# Patient Record
Sex: MALE | Race: AMERICAN INDIAN OR ALASKA NATIVE | NOT HISPANIC OR LATINO | Employment: OTHER | URBAN - METROPOLITAN AREA
[De-identification: names, ages, dates, MRNs, and addresses within clinical notes are randomized per-mention and may not be internally consistent; named-entity substitution may affect disease eponyms.]

---

## 2018-12-26 ENCOUNTER — TELEPHONE (OUTPATIENT)
Dept: SCHEDULING | Facility: IMAGING CENTER | Age: 68
End: 2018-12-26

## 2019-01-03 ENCOUNTER — OFFICE VISIT (OUTPATIENT)
Dept: MEDICAL GROUP | Facility: PHYSICIAN GROUP | Age: 69
End: 2019-01-03
Payer: MEDICARE

## 2019-01-03 VITALS
WEIGHT: 145 LBS | TEMPERATURE: 98.1 F | SYSTOLIC BLOOD PRESSURE: 132 MMHG | RESPIRATION RATE: 16 BRPM | HEIGHT: 64 IN | HEART RATE: 67 BPM | DIASTOLIC BLOOD PRESSURE: 78 MMHG | BODY MASS INDEX: 24.75 KG/M2 | OXYGEN SATURATION: 96 %

## 2019-01-03 DIAGNOSIS — I10 ESSENTIAL HYPERTENSION: ICD-10-CM

## 2019-01-03 PROCEDURE — 99203 OFFICE O/P NEW LOW 30 MIN: CPT | Performed by: FAMILY MEDICINE

## 2019-01-03 RX ORDER — DULOXETIN HYDROCHLORIDE 60 MG/1
60 CAPSULE, DELAYED RELEASE ORAL DAILY
COMMUNITY
End: 2019-08-29 | Stop reason: SDUPTHER

## 2019-01-03 RX ORDER — METHADONE HYDROCHLORIDE 5 MG/1
5 TABLET ORAL EVERY 12 HOURS
Refills: 0 | COMMUNITY
Start: 2018-12-14 | End: 2022-09-23

## 2019-01-03 RX ORDER — PROPRANOLOL HYDROCHLORIDE 80 MG/1
CAPSULE, EXTENDED RELEASE ORAL
COMMUNITY
Start: 2018-11-20 | End: 2020-02-21

## 2019-01-03 RX ORDER — ALBUTEROL SULFATE 90 UG/1
2 AEROSOL, METERED RESPIRATORY (INHALATION) EVERY 6 HOURS PRN
COMMUNITY
End: 2020-08-19

## 2019-01-03 RX ORDER — ACYCLOVIR 400 MG/1
400 TABLET ORAL
COMMUNITY
End: 2019-02-25 | Stop reason: SDUPTHER

## 2019-01-03 ASSESSMENT — PATIENT HEALTH QUESTIONNAIRE - PHQ9: CLINICAL INTERPRETATION OF PHQ2 SCORE: 0

## 2019-01-04 NOTE — PROGRESS NOTES
Complaint: Review meds     Subjective:     Raffaele Burton is a 68 y.o. male here today to review his meds and establish with new provider.    Essential hypertension  Doing well on Inderal LA 80 mg/day.     Has not been taking his Carbidopa/levodopa for his RLS in months. Not needing his MDI recently at all.    Gets eyes and teeth checked regularly.    Lives alone, 2 cats, works on his classic car.    Current medicines (including changes today)  Current Outpatient Prescriptions   Medication Sig Dispense Refill   • DULoxetine (CYMBALTA) 60 MG Cap DR Particles delayed-release capsule Take 60 mg by mouth every day.     • acyclovir (ZOVIRAX) 400 MG tablet Take 400 mg by mouth 2 times daily with meals as needed.     • albuterol 108 (90 Base) MCG/ACT Aero Soln inhalation aerosol Inhale 2 Puffs by mouth every 6 hours as needed.     • propranolol CR (INDERAL LA) 80 MG CAPSULE SR 24 HR      • methadone (DOLOPHINE) 5 MG Tab Take 5 mg by mouth every 12 hours. FOR 30 DAYS  0     No current facility-administered medications for this visit.      He  has a past medical history of Anxiety; Asthma; Herpes simplex; Hypertension; Restless leg syndrome; and Shoulder pain, right.    Health Maintenance: utd according to patient.      Allergies: Benadryl [diphenhydramine] and Hydroxyzine    Current Outpatient Prescriptions Ordered in [facility]   Medication Sig Dispense Refill   • DULoxetine (CYMBALTA) 60 MG Cap DR Particles delayed-release capsule Take 60 mg by mouth every day.     • acyclovir (ZOVIRAX) 400 MG tablet Take 400 mg by mouth 2 times daily with meals as needed.     • albuterol 108 (90 Base) MCG/ACT Aero Soln inhalation aerosol Inhale 2 Puffs by mouth every 6 hours as needed.     • propranolol CR (INDERAL LA) 80 MG CAPSULE SR 24 HR      • methadone (DOLOPHINE) 5 MG Tab Take 5 mg by mouth every 12 hours. FOR 30 DAYS  0     No current [facility]-ordered facility-administered medications on file.        Past Medical History:   Diagnosis Date  "  • Anxiety    • Asthma    • Herpes simplex    • Hypertension    • Restless leg syndrome    • Shoulder pain, right        Past Surgical History:   Procedure Laterality Date   • ACL RECONSTRUCTION Left    • ARTHROPLASTY, SHOULDER Right    • HERNIA REPAIR Left    • TONSILLECTOMY AND ADENOIDECTOMY     • VASECTOMY         Social History   Substance Use Topics   • Smoking status: Former Smoker     Types: Cigarettes     Quit date: 1/3/2014   • Smokeless tobacco: Never Used   • Alcohol use No       Social History     Social History Narrative   • No narrative on file       Family History   Problem Relation Age of Onset   • Cancer Mother         breast   • Diabetes Mother    • Other Sister         Crohn's   • Alcohol/Drug Brother    • Hypertension Sister    • Alcohol/Drug Brother    • Heart Disease Brother          ROS   Patient denies any fever, chills, unintentional weight gain/loss, fatigue, stroke symptoms, dizziness, headache, nasal congestion, sore-throat, cough, heartburn, chest pain, difficulty breathing, abdominal discomfort, diarrhea/constipation, burning with urination or frequency, joint or back pain, skin rashes, depression or anxiety.       Objective:     Blood pressure 132/78, pulse 67, temperature 36.7 °C (98.1 °F), temperature source Temporal, resp. rate 16, height 1.626 m (5' 4\"), weight 65.8 kg (145 lb), SpO2 96 %. Body mass index is 24.89 kg/m².   Physical Exam:  Constitutional: Alert, no distress.  Skin: Warm, dry, good turgor, no rashes in visible areas.  Eye: Equal, round and reactive, conjunctiva clear, lids normal.  ENMT: Lips without lesions, average dentition, oropharynx clear.  Neck: Trachea midline, no masses, no thyromegaly. No cervical or supraclavicular lymphadenopathy  Respiratory: Unlabored respiratory effort, lungs clear to auscultation, no wheezes, no ronchi.  Cardiovascular: Normal S1, S2, no murmur, no extremity edema.  Psych: Alert and oriented x3, appropriate affect and " mood.        Assessment and Plan:   The following treatment plan was discussed    1. Essential hypertension  Chronic problem. Controlled on med.      Followup: Return in about 6 months (around 7/3/2019), or if symptoms worsen or fail to improve.    Please note that this dictation was created using voice recognition software. I have made every reasonable attempt to correct obvious errors, but I expect that there are errors of grammar and possibly content that I did not discover before finalizing the note.

## 2019-02-25 RX ORDER — ACYCLOVIR 400 MG/1
400 TABLET ORAL
Qty: 180 TAB | Refills: 1 | Status: SHIPPED | OUTPATIENT
Start: 2019-02-25 | End: 2019-09-01 | Stop reason: SDUPTHER

## 2019-06-13 ENCOUNTER — TELEPHONE (OUTPATIENT)
Dept: MEDICAL GROUP | Facility: PHYSICIAN GROUP | Age: 69
End: 2019-06-13

## 2019-06-13 NOTE — TELEPHONE ENCOUNTER
Carbodipa-Levodopa 25-50  Take 1 tab by mouth daily    This is not in the medication list so I am sending this as an message.

## 2019-06-17 RX ORDER — CARBIDOPA/LEVODOPA 25MG-250MG
1 TABLET ORAL 3 TIMES DAILY
COMMUNITY
End: 2019-06-17 | Stop reason: SDUPTHER

## 2019-06-17 RX ORDER — CARBIDOPA/LEVODOPA 25MG-250MG
1 TABLET ORAL DAILY
Qty: 90 TAB | Refills: 1 | Status: SHIPPED | OUTPATIENT
Start: 2019-06-17 | End: 2019-11-05

## 2019-06-18 ENCOUNTER — OFFICE VISIT (OUTPATIENT)
Dept: URGENT CARE | Facility: CLINIC | Age: 69
End: 2019-06-18
Payer: MEDICARE

## 2019-06-18 VITALS
BODY MASS INDEX: 25.61 KG/M2 | DIASTOLIC BLOOD PRESSURE: 84 MMHG | HEART RATE: 81 BPM | TEMPERATURE: 97.7 F | SYSTOLIC BLOOD PRESSURE: 120 MMHG | RESPIRATION RATE: 16 BRPM | WEIGHT: 150 LBS | OXYGEN SATURATION: 94 % | HEIGHT: 64 IN

## 2019-06-18 DIAGNOSIS — R10.12 LEFT UPPER QUADRANT PAIN: ICD-10-CM

## 2019-06-18 PROCEDURE — 99213 OFFICE O/P EST LOW 20 MIN: CPT | Performed by: PHYSICIAN ASSISTANT

## 2019-06-18 ASSESSMENT — ENCOUNTER SYMPTOMS
SORE THROAT: 0
COUGH: 0
EYE DISCHARGE: 0
HEADACHES: 0
SHORTNESS OF BREATH: 0
EYE REDNESS: 0
DIARRHEA: 1
FEVER: 1
VOMITING: 0
NAUSEA: 0
HEMATOCHEZIA: 0
ABDOMINAL PAIN: 1
CONSTIPATION: 0
MYALGIAS: 0

## 2019-06-18 NOTE — PROGRESS NOTES
Subjective:      Raffaele Burton is a 68 y.o. male who presents with Abdominal Pain (left side)          Abdominal Pain   This is a new problem. Episode onset: x 4 days. The onset quality is gradual. The problem occurs constantly. The problem has been gradually improving. The pain is located in the LUQ. The pain is mild. The quality of the pain is aching and dull. The abdominal pain does not radiate. Associated symptoms include diarrhea (after taking the laxative, now resolved) and a fever (subjective fever, now resolved). Pertinent negatives include no constipation, dysuria, frequency, headaches, hematochezia, myalgias, nausea or vomiting. The pain is aggravated by certain positions. The pain is relieved by nothing. Treatments tried: Laxative. The treatment provided mild relief.     The patient presents to clinic c/o left upper quadrant abdominal pain x 4 days. The patient states he developed the pain after eating a large meal. The patient states the pain has gradually improved over the past few days. The patient has taken a laxative for his symptoms. The patient reports diarrhea after taking the laxative, but states this is now resolved. The patient reports normal bowel movements. The patient denies nausea/vomiting, dysuria, bloody stools, heartburn, chest pain, and fever.     PMH:  has a past medical history of Anxiety; Asthma; Herpes simplex; Hypertension; Restless leg syndrome; and Shoulder pain, right.  MEDS:   Current Outpatient Prescriptions:   •  carbidopa-levodopa (SINEMET)  MG Tab, Take 1 Tab by mouth every day., Disp: 90 Tab, Rfl: 1  •  acyclovir (ZOVIRAX) 400 MG tablet, Take 1 Tab by mouth 2 times daily with meals as needed., Disp: 180 Tab, Rfl: 1  •  propranolol CR (INDERAL LA) 80 MG CAPSULE SR 24 HR, , Disp: , Rfl:   •  methadone (DOLOPHINE) 5 MG Tab, Take 5 mg by mouth every 12 hours. FOR 30 DAYS, Disp: , Rfl: 0  •  DULoxetine (CYMBALTA) 60 MG Cap DR Particles delayed-release capsule, Take 60  "mg by mouth every day., Disp: , Rfl:   •  albuterol 108 (90 Base) MCG/ACT Aero Soln inhalation aerosol, Inhale 2 Puffs by mouth every 6 hours as needed., Disp: , Rfl:   ALLERGIES:   Allergies   Allergen Reactions   • Benadryl [Diphenhydramine]    • Hydroxyzine      psychosis     SURGHX:   Past Surgical History:   Procedure Laterality Date   • ACL RECONSTRUCTION Left    • ARTHROPLASTY, SHOULDER Right    • HERNIA REPAIR Left    • TONSILLECTOMY AND ADENOIDECTOMY     • VASECTOMY       SOCHX:  reports that he quit smoking about 5 years ago. His smoking use included Cigarettes. He has never used smokeless tobacco. He reports that he does not drink alcohol or use drugs.  FH: Family history was reviewed, no pertinent findings to report    Review of Systems   Constitutional: Positive for fever (subjective fever, now resolved).   HENT: Negative for congestion, ear pain and sore throat.    Eyes: Negative for discharge and redness.   Respiratory: Negative for cough and shortness of breath.    Cardiovascular: Negative for chest pain and leg swelling.   Gastrointestinal: Positive for abdominal pain and diarrhea (after taking the laxative, now resolved). Negative for constipation, hematochezia, nausea and vomiting.   Genitourinary: Negative for dysuria and frequency.   Musculoskeletal: Negative for myalgias.   Skin: Negative for rash.   Neurological: Negative for headaches.   All other systems reviewed and are negative.         Objective:     /84 (BP Location: Right arm, Patient Position: Sitting)   Pulse 81   Temp 36.5 °C (97.7 °F)   Resp 16   Ht 1.626 m (5' 4\")   Wt 68 kg (150 lb)   SpO2 94%   BMI 25.75 kg/m²      Physical Exam   Constitutional: He is oriented to person, place, and time. He appears well-developed and well-nourished. No distress.   HENT:   Head: Normocephalic and atraumatic.   Right Ear: External ear normal.   Left Ear: External ear normal.   Nose: Nose normal.   Mouth/Throat: Oropharynx is clear and " moist.   Eyes: Conjunctivae and EOM are normal.   Neck: Normal range of motion. Neck supple.   Cardiovascular: Normal rate, regular rhythm and normal heart sounds.    Pulmonary/Chest: Effort normal and breath sounds normal.   Abdominal: Soft. Bowel sounds are normal. There is tenderness in the left upper quadrant. There is no rigidity, no rebound and no CVA tenderness.   Musculoskeletal: Normal range of motion. He exhibits no edema.   Neurological: He is alert and oriented to person, place, and time.   Skin: Skin is warm and dry.          Progress:  Abdominal US - pending     CBC - pending    CMP - pending     Lipase - pending     Assessment/Plan:     1. Left upper quadrant pain  The cause of the patient's left upper quadrant abdominal pain is unknown at this time.  Will order an abdominal ultrasound to further evaluate the patient's symptoms.  Will also order a CBC, CMP, and Lipase to further evaluate the patient's symptoms.  Will call the patient with results.  Recommend the patient follow-up with his PCP regarding his symptoms.  Discussed strict return precautions with the patient, and he verbalized understanding.    - CBC WITH DIFFERENTIAL; Future  - Comp Metabolic Panel; Future  - LIPASE; Future  - US-ABDOMEN COMPLETE SURVEY; Future    OTC Tylenol or Motrin for fever/discomfort.  Drink plenty of fluids  Lucas diet  Follow-up with PCP as needed  Return to clinic or go to the ED if symptoms worsen or fail to improve, or if the patient should develop worsening/increasing/persistent abdominal pain, nausea/vomiting, diarrhea, urinary symptoms, bloody stools, heartburn, chest pain, shortness of breath, fever/chills, and/or any concerning symptoms.    Discussed plan with the patient, and he agrees to the above.

## 2019-06-19 ENCOUNTER — HOSPITAL ENCOUNTER (OUTPATIENT)
Dept: LAB | Facility: MEDICAL CENTER | Age: 69
End: 2019-06-19
Attending: PHYSICIAN ASSISTANT
Payer: MEDICARE

## 2019-06-19 ENCOUNTER — DOCUMENTATION (OUTPATIENT)
Dept: URGENT CARE | Facility: CLINIC | Age: 69
End: 2019-06-19

## 2019-06-19 DIAGNOSIS — R10.12 LEFT UPPER QUADRANT PAIN: ICD-10-CM

## 2019-06-19 LAB
ALBUMIN SERPL BCP-MCNC: 4.6 G/DL (ref 3.2–4.9)
ALBUMIN/GLOB SERPL: 1.7 G/DL
ALP SERPL-CCNC: 98 U/L (ref 30–99)
ALT SERPL-CCNC: 19 U/L (ref 2–50)
ANION GAP SERPL CALC-SCNC: 8 MMOL/L (ref 0–11.9)
AST SERPL-CCNC: 19 U/L (ref 12–45)
BASOPHILS # BLD AUTO: 0.9 % (ref 0–1.8)
BASOPHILS # BLD: 0.09 K/UL (ref 0–0.12)
BILIRUB SERPL-MCNC: 0.7 MG/DL (ref 0.1–1.5)
BUN SERPL-MCNC: 14 MG/DL (ref 8–22)
CALCIUM SERPL-MCNC: 9.9 MG/DL (ref 8.5–10.5)
CHLORIDE SERPL-SCNC: 104 MMOL/L (ref 96–112)
CO2 SERPL-SCNC: 28 MMOL/L (ref 20–33)
CREAT SERPL-MCNC: 0.99 MG/DL (ref 0.5–1.4)
EOSINOPHIL # BLD AUTO: 0.2 K/UL (ref 0–0.51)
EOSINOPHIL NFR BLD: 2 % (ref 0–6.9)
ERYTHROCYTE [DISTWIDTH] IN BLOOD BY AUTOMATED COUNT: 43.8 FL (ref 35.9–50)
GLOBULIN SER CALC-MCNC: 2.7 G/DL (ref 1.9–3.5)
GLUCOSE SERPL-MCNC: 115 MG/DL (ref 65–99)
HCT VFR BLD AUTO: 45.8 % (ref 42–52)
HGB BLD-MCNC: 14.9 G/DL (ref 14–18)
IMM GRANULOCYTES # BLD AUTO: 0.05 K/UL (ref 0–0.11)
IMM GRANULOCYTES NFR BLD AUTO: 0.5 % (ref 0–0.9)
LIPASE SERPL-CCNC: 23 U/L (ref 11–82)
LYMPHOCYTES # BLD AUTO: 1.87 K/UL (ref 1–4.8)
LYMPHOCYTES NFR BLD: 19 % (ref 22–41)
MCH RBC QN AUTO: 30.8 PG (ref 27–33)
MCHC RBC AUTO-ENTMCNC: 32.5 G/DL (ref 33.7–35.3)
MCV RBC AUTO: 94.6 FL (ref 81.4–97.8)
MONOCYTES # BLD AUTO: 0.86 K/UL (ref 0–0.85)
MONOCYTES NFR BLD AUTO: 8.7 % (ref 0–13.4)
NEUTROPHILS # BLD AUTO: 6.77 K/UL (ref 1.82–7.42)
NEUTROPHILS NFR BLD: 68.9 % (ref 44–72)
NRBC # BLD AUTO: 0 K/UL
NRBC BLD-RTO: 0 /100 WBC
PLATELET # BLD AUTO: 236 K/UL (ref 164–446)
PMV BLD AUTO: 10.6 FL (ref 9–12.9)
POTASSIUM SERPL-SCNC: 4.4 MMOL/L (ref 3.6–5.5)
PROT SERPL-MCNC: 7.3 G/DL (ref 6–8.2)
RBC # BLD AUTO: 4.84 M/UL (ref 4.7–6.1)
SODIUM SERPL-SCNC: 140 MMOL/L (ref 135–145)
WBC # BLD AUTO: 9.8 K/UL (ref 4.8–10.8)

## 2019-06-19 PROCEDURE — 85025 COMPLETE CBC W/AUTO DIFF WBC: CPT

## 2019-06-19 PROCEDURE — 80053 COMPREHEN METABOLIC PANEL: CPT

## 2019-06-19 PROCEDURE — 36415 COLL VENOUS BLD VENIPUNCTURE: CPT

## 2019-06-19 PROCEDURE — 83690 ASSAY OF LIPASE: CPT

## 2019-06-20 ENCOUNTER — TELEPHONE (OUTPATIENT)
Dept: URGENT CARE | Facility: CLINIC | Age: 69
End: 2019-06-20

## 2019-06-20 DIAGNOSIS — K80.20 CALCULUS OF GALLBLADDER WITHOUT CHOLECYSTITIS WITHOUT OBSTRUCTION: ICD-10-CM

## 2019-06-20 NOTE — TELEPHONE ENCOUNTER
6/20 @ 10:17am    Spoke with the patient regarding his US and laboratory results. Informed the patient the US of his abdomen showed gall stones without acute inflammation. Will refer the patient to general surgery for further evaluation. Additionally, the patient's CBC and CMP showed no acute abnormalities. The patient's Lipase was within normal limits. Discussed return precautions with the patient, and he verbalized understanding. Recommend the patient follow-up with his PCP.

## 2019-07-09 ENCOUNTER — HOSPITAL ENCOUNTER (OUTPATIENT)
Dept: LAB | Facility: MEDICAL CENTER | Age: 69
End: 2019-07-09
Attending: FAMILY MEDICINE
Payer: MEDICARE

## 2019-07-09 ENCOUNTER — OFFICE VISIT (OUTPATIENT)
Dept: MEDICAL GROUP | Facility: PHYSICIAN GROUP | Age: 69
End: 2019-07-09
Payer: MEDICARE

## 2019-07-09 VITALS
DIASTOLIC BLOOD PRESSURE: 82 MMHG | WEIGHT: 150.57 LBS | SYSTOLIC BLOOD PRESSURE: 120 MMHG | HEART RATE: 78 BPM | RESPIRATION RATE: 14 BRPM | HEIGHT: 65 IN | OXYGEN SATURATION: 94 % | BODY MASS INDEX: 25.09 KG/M2 | TEMPERATURE: 98 F

## 2019-07-09 DIAGNOSIS — B00.9 HERPES SIMPLEX: ICD-10-CM

## 2019-07-09 DIAGNOSIS — G25.81 RLS (RESTLESS LEGS SYNDROME): ICD-10-CM

## 2019-07-09 DIAGNOSIS — R73.9 ELEVATED BLOOD SUGAR: ICD-10-CM

## 2019-07-09 DIAGNOSIS — I10 ESSENTIAL HYPERTENSION: ICD-10-CM

## 2019-07-09 DIAGNOSIS — J45.20 MILD INTERMITTENT ASTHMA WITHOUT COMPLICATION: ICD-10-CM

## 2019-07-09 PROBLEM — K80.20 CHOLELITHIASIS: Status: ACTIVE | Noted: 2019-07-09

## 2019-07-09 LAB
EST. AVERAGE GLUCOSE BLD GHB EST-MCNC: 111 MG/DL
HBA1C MFR BLD: 5.5 % (ref 0–5.6)

## 2019-07-09 PROCEDURE — 36415 COLL VENOUS BLD VENIPUNCTURE: CPT | Mod: GA

## 2019-07-09 PROCEDURE — 99214 OFFICE O/P EST MOD 30 MIN: CPT | Performed by: FAMILY MEDICINE

## 2019-07-09 PROCEDURE — 83036 HEMOGLOBIN GLYCOSYLATED A1C: CPT | Mod: GA

## 2019-08-29 ENCOUNTER — OFFICE VISIT (OUTPATIENT)
Dept: MEDICAL GROUP | Facility: PHYSICIAN GROUP | Age: 69
End: 2019-08-29
Payer: MEDICARE

## 2019-08-29 VITALS
HEART RATE: 82 BPM | HEIGHT: 64 IN | SYSTOLIC BLOOD PRESSURE: 122 MMHG | BODY MASS INDEX: 25.61 KG/M2 | TEMPERATURE: 98.5 F | OXYGEN SATURATION: 97 % | RESPIRATION RATE: 12 BRPM | DIASTOLIC BLOOD PRESSURE: 70 MMHG | WEIGHT: 150 LBS

## 2019-08-29 DIAGNOSIS — R68.89 FEELING UNWELL: ICD-10-CM

## 2019-08-29 DIAGNOSIS — G89.29 ENCOUNTER FOR CHRONIC PAIN MANAGEMENT: ICD-10-CM

## 2019-08-29 DIAGNOSIS — F11.93 WITHDRAWAL FROM OPIOIDS (HCC): ICD-10-CM

## 2019-08-29 PROBLEM — K80.20 CHOLELITHIASIS: Status: RESOLVED | Noted: 2019-07-09 | Resolved: 2019-08-29

## 2019-08-29 PROCEDURE — 99213 OFFICE O/P EST LOW 20 MIN: CPT | Performed by: FAMILY MEDICINE

## 2019-08-29 RX ORDER — DULOXETIN HYDROCHLORIDE 60 MG/1
CAPSULE, DELAYED RELEASE ORAL
Qty: 20 CAP | Refills: 0 | Status: SHIPPED | OUTPATIENT
Start: 2019-08-29 | End: 2019-09-17

## 2019-08-29 NOTE — ASSESSMENT & PLAN NOTE
Having cramping in legs, numb feeling. Has been on Cymbalta, started by previous provider. Did nothing for his pain.    Ran out of methadone the other day, due for refill in 2 days. Lightheaded, nausea, achy all over.

## 2019-08-29 NOTE — PROGRESS NOTES
Complaint: Not feeling well.     Subjective:     Raffaele Burton is a 68 y.o. male here today for f/u.    Feeling unwell  Having cramping in legs, numb feeling. Has been on Cymbalta, started by previous provider. Did nothing for his pain.    Ran out of methadone the other day, due for refill in 2 days. Lightheaded, nausea, achy all over.      Had his gallbladder removed, the next day was still under effect of anesthesia. Made decisions without knowing what he was doing (canceled his insurance policy, called various people does not remember calling them, ...).    Current medicines (including changes today)  Current Outpatient Medications   Medication Sig Dispense Refill   • DULoxetine (CYMBALTA) 60 MG Cap DR Particles delayed-release capsule Take 1 cap every other day for 1 week, then one every 3 days for 2 weeks, then stop. 20 Cap 0   • carbidopa-levodopa (SINEMET)  MG Tab Take 1 Tab by mouth every day. 90 Tab 1   • acyclovir (ZOVIRAX) 400 MG tablet Take 1 Tab by mouth 2 times daily with meals as needed. 180 Tab 1   • propranolol CR (INDERAL LA) 80 MG CAPSULE SR 24 HR      • methadone (DOLOPHINE) 5 MG Tab Take 5 mg by mouth every 12 hours. FOR 30 DAYS  0   • albuterol 108 (90 Base) MCG/ACT Aero Soln inhalation aerosol Inhale 2 Puffs by mouth every 6 hours as needed.       No current facility-administered medications for this visit.      He  has a past medical history of Anxiety, Asthma, Herpes simplex, Hypertension, Restless leg syndrome, and Shoulder pain, right.    Health Maintenance:       Allergies: Benadryl [diphenhydramine] and Hydroxyzine    Current Outpatient Medications Ordered in [facility]   Medication Sig Dispense Refill   • DULoxetine (CYMBALTA) 60 MG Cap DR Particles delayed-release capsule Take 1 cap every other day for 1 week, then one every 3 days for 2 weeks, then stop. 20 Cap 0   • carbidopa-levodopa (SINEMET)  MG Tab Take 1 Tab by mouth every day. 90 Tab 1   • acyclovir (ZOVIRAX) 400 MG  "tablet Take 1 Tab by mouth 2 times daily with meals as needed. 180 Tab 1   • propranolol CR (INDERAL LA) 80 MG CAPSULE SR 24 HR      • methadone (DOLOPHINE) 5 MG Tab Take 5 mg by mouth every 12 hours. FOR 30 DAYS  0   • albuterol 108 (90 Base) MCG/ACT Aero Soln inhalation aerosol Inhale 2 Puffs by mouth every 6 hours as needed.       No current [facility]-ordered facility-administered medications on file.        Past Medical History:   Diagnosis Date   • Anxiety    • Asthma    • Herpes simplex    • Hypertension    • Restless leg syndrome    • Shoulder pain, right        Past Surgical History:   Procedure Laterality Date   • ACL RECONSTRUCTION Left    • ARTHROPLASTY, SHOULDER Right    • CHOLECYSTECTOMY     • HERNIA REPAIR Left    • TONSILLECTOMY AND ADENOIDECTOMY     • VASECTOMY         Social History     Tobacco Use   • Smoking status: Former Smoker     Types: Cigarettes     Last attempt to quit: 1/3/2014     Years since quittin.6   • Smokeless tobacco: Never Used   Substance Use Topics   • Alcohol use: No   • Drug use: No       Social History     Social History Narrative   • Not on file       Family History   Problem Relation Age of Onset   • Cancer Mother         breast   • Diabetes Mother    • Other Sister         Crohn's   • Alcohol/Drug Brother    • Hypertension Sister    • Alcohol/Drug Brother    • Heart Disease Brother          ROS Positive for general malaise, aching all over, leg cramps, numb feeling in legs.  Patient denies any fever, chills, unintentional weight gain/loss, fatigue, stroke symptoms, dizziness, headache, nasal congestion, sore-throat, cough, heartburn, chest pain, difficulty breathing, abdominal discomfort, diarrhea/constipation, burning with urination or frequency, joint or back pain, skin rashes, depression or anxiety.       Objective:     /70   Pulse 82   Temp 36.9 °C (98.5 °F)   Resp 12   Ht 1.626 m (5' 4\")   Wt 68 kg (150 lb)   SpO2 97%  Body mass index is 25.75 kg/m². "   Physical Exam:  Constitutional: Alert, no distress.  No formal exam  Psych: Alert and oriented x3, appropriate affect and mood.        Assessment and Plan:   The following treatment plan was discussed    1. Encounter for chronic pain management  Taper off Cymbalta over next 2 weeks a directed.  - DULoxetine (CYMBALTA) 60 MG Cap DR Particles delayed-release capsule; Take 1 cap every other day for 1 week, then one every 3 days for 2 weeks, then stop.  Dispense: 20 Cap; Refill: 0    2. Feeling unwell/Withdrawal from opioids (HCC)  Needs to keep well hydrated. Resume methadone on Saturday.          Followup: Return in about 2 weeks (around 9/12/2019).    Please note that this dictation was created using voice recognition software. I have made every reasonable attempt to correct obvious errors, but I expect that there are errors of grammar and possibly content that I did not discover before finalizing the note.

## 2019-09-03 DIAGNOSIS — G89.29 ENCOUNTER FOR CHRONIC PAIN MANAGEMENT: ICD-10-CM

## 2019-09-03 RX ORDER — DULOXETIN HYDROCHLORIDE 60 MG/1
CAPSULE, DELAYED RELEASE ORAL
Qty: 20 CAP | Refills: 0 | OUTPATIENT
Start: 2019-09-03

## 2019-09-04 DIAGNOSIS — G89.29 ENCOUNTER FOR CHRONIC PAIN MANAGEMENT: ICD-10-CM

## 2019-09-04 RX ORDER — DULOXETIN HYDROCHLORIDE 60 MG/1
CAPSULE, DELAYED RELEASE ORAL
Qty: 5 CAP | Refills: 0 | OUTPATIENT
Start: 2019-09-04

## 2019-09-04 NOTE — TELEPHONE ENCOUNTER
Previous script went to [facility] and they won't fill it, he would like it sent to [place]. States he only needs 5 till he gets in to see you 9-17

## 2019-09-17 ENCOUNTER — OFFICE VISIT (OUTPATIENT)
Dept: MEDICAL GROUP | Facility: PHYSICIAN GROUP | Age: 69
End: 2019-09-17
Payer: MEDICARE

## 2019-09-17 VITALS
DIASTOLIC BLOOD PRESSURE: 72 MMHG | OXYGEN SATURATION: 98 % | HEIGHT: 64 IN | TEMPERATURE: 98.2 F | RESPIRATION RATE: 16 BRPM | BODY MASS INDEX: 26.05 KG/M2 | HEART RATE: 72 BPM | WEIGHT: 152.56 LBS | SYSTOLIC BLOOD PRESSURE: 118 MMHG

## 2019-09-17 DIAGNOSIS — F99 INSOMNIA DUE TO OTHER MENTAL DISORDER: ICD-10-CM

## 2019-09-17 DIAGNOSIS — F51.05 INSOMNIA DUE TO OTHER MENTAL DISORDER: ICD-10-CM

## 2019-09-17 DIAGNOSIS — G25.81 RLS (RESTLESS LEGS SYNDROME): ICD-10-CM

## 2019-09-17 DIAGNOSIS — F41.9 ANXIETY: ICD-10-CM

## 2019-09-17 DIAGNOSIS — J45.20 MILD INTERMITTENT ASTHMA WITHOUT COMPLICATION: ICD-10-CM

## 2019-09-17 DIAGNOSIS — I10 ESSENTIAL HYPERTENSION: ICD-10-CM

## 2019-09-17 PROBLEM — R73.9 ELEVATED BLOOD SUGAR: Status: RESOLVED | Noted: 2019-07-09 | Resolved: 2019-09-17

## 2019-09-17 PROBLEM — R68.89 FEELING UNWELL: Status: RESOLVED | Noted: 2019-08-29 | Resolved: 2019-09-17

## 2019-09-17 PROCEDURE — 99214 OFFICE O/P EST MOD 30 MIN: CPT | Performed by: FAMILY MEDICINE

## 2019-09-17 RX ORDER — TRAZODONE HYDROCHLORIDE 50 MG/1
50 TABLET ORAL
Qty: 30 TAB | Refills: 3 | Status: SHIPPED | OUTPATIENT
Start: 2019-09-17 | End: 2019-11-05

## 2019-09-17 RX ORDER — CITALOPRAM HYDROBROMIDE 10 MG/1
10 TABLET ORAL DAILY
Qty: 30 TAB | Refills: 2 | Status: SHIPPED | OUTPATIENT
Start: 2019-09-17 | End: 2019-11-05

## 2019-09-18 NOTE — PROGRESS NOTES
Complaint: f/u anxiety     Subjective:     Raffaele Burton is a 68 y.o. male here today for f/u.    RLS (restless legs syndrome)  Less since off Cymbalta. Still gets them on occasion.    Mild intermittent asthma without complication  Rarely needs MDI.    Insomnia due to other mental disorder  Trouble getting to and staying asleep. Hydroxyzine did not help. Has tried trazodone in past.    Anxiety  Still having lots of anxiety.     Essential hypertension  Controlled on Inderal.     No other concerns or complaints today.    Current medicines (including changes today)  Current Outpatient Medications   Medication Sig Dispense Refill   • citalopram (CELEXA) 10 MG tablet Take 1 Tab by mouth every day. 30 Tab 2   • traZODone (DESYREL) 50 MG Tab Take 1 Tab by mouth every bedtime. 30 Tab 3   • acyclovir (ZOVIRAX) 400 MG tablet TAKE 1 TAB BY MOUTH 2 TIMES DAILY WITH MEALS AS NEEDED. 200 Tab 1   • carbidopa-levodopa (SINEMET)  MG Tab Take 1 Tab by mouth every day. 90 Tab 1   • propranolol CR (INDERAL LA) 80 MG CAPSULE SR 24 HR      • methadone (DOLOPHINE) 5 MG Tab Take 5 mg by mouth every 12 hours. FOR 30 DAYS  0   • albuterol 108 (90 Base) MCG/ACT Aero Soln inhalation aerosol Inhale 2 Puffs by mouth every 6 hours as needed.       No current facility-administered medications for this visit.      He  has a past medical history of Anxiety, Asthma, Herpes simplex, Hypertension, Restless leg syndrome, and Shoulder pain, right.    Health Maintenance:       Allergies: Benadryl [diphenhydramine] and Hydroxyzine    Current Outpatient Medications Ordered in [facility]   Medication Sig Dispense Refill   • citalopram (CELEXA) 10 MG tablet Take 1 Tab by mouth every day. 30 Tab 2   • traZODone (DESYREL) 50 MG Tab Take 1 Tab by mouth every bedtime. 30 Tab 3   • acyclovir (ZOVIRAX) 400 MG tablet TAKE 1 TAB BY MOUTH 2 TIMES DAILY WITH MEALS AS NEEDED. 200 Tab 1   • carbidopa-levodopa (SINEMET)  MG Tab Take 1 Tab by mouth every day. 90  "Tab 1   • propranolol CR (INDERAL LA) 80 MG CAPSULE SR 24 HR      • methadone (DOLOPHINE) 5 MG Tab Take 5 mg by mouth every 12 hours. FOR 30 DAYS  0   • albuterol 108 (90 Base) MCG/ACT Aero Soln inhalation aerosol Inhale 2 Puffs by mouth every 6 hours as needed.       No current Epic-ordered facility-administered medications on file.        Past Medical History:   Diagnosis Date   • Anxiety    • Asthma    • Herpes simplex    • Hypertension    • Restless leg syndrome    • Shoulder pain, right        Past Surgical History:   Procedure Laterality Date   • ACL RECONSTRUCTION Left    • ARTHROPLASTY, SHOULDER Right    • CHOLECYSTECTOMY     • HERNIA REPAIR Left    • TONSILLECTOMY AND ADENOIDECTOMY     • VASECTOMY         Social History     Tobacco Use   • Smoking status: Former Smoker     Types: Cigarettes     Last attempt to quit: 1/3/2014     Years since quittin.7   • Smokeless tobacco: Never Used   Substance Use Topics   • Alcohol use: No   • Drug use: No       Social History     Social History Narrative   • Not on file       Family History   Problem Relation Age of Onset   • Cancer Mother         breast   • Diabetes Mother    • Other Sister         Crohn's   • Alcohol/Drug Brother    • Hypertension Sister    • Alcohol/Drug Brother    • Heart Disease Brother          ROS Positive for depression,anxiety, cramps in legs at night, trouble getting to sleep.  Patient denies any fever, chills, unintentional weight gain/loss, fatigue, stroke symptoms, dizziness, headache, nasal congestion, sore-throat, cough, heartburn, chest pain, difficulty breathing, abdominal discomfort, diarrhea/constipation, burning with urination or frequency, joint or back pain, skin rashes.       Objective:     /72   Pulse 72   Temp 36.8 °C (98.2 °F) (Temporal)   Resp 16   Ht 1.626 m (5' 4\")   Wt 69.2 kg (152 lb 8.9 oz)   SpO2 98%  Body mass index is 26.19 kg/m².   Physical Exam:  Constitutional: Alert, no distress. Depressed " affect.        Assessment and Plan:   The following treatment plan was discussed    1. Anxiety  Uncontrolled. Start Citalopram.  - citalopram (CELEXA) 10 MG tablet; Take 1 Tab by mouth every day.  Dispense: 30 Tab; Refill: 2    2. Insomnia due to other mental disorder  Uncontrolled. Stat trazodone again.  - traZODone (DESYREL) 50 MG Tab; Take 1 Tab by mouth every bedtime.  Dispense: 30 Tab; Refill: 3    3. Essential hypertension  Controlled on meds.    4. Mild intermittent asthma without complication  Stable. Needs flu shot yearly.    5. RLS (restless legs syndrome)  Consider drinking tonic water daily or taking magnesium syupplement.      Followup: Return in about 2 weeks (around 10/1/2019), or if symptoms worsen or fail to improve.    Please note that this dictation was created using voice recognition software. I have made every reasonable attempt to correct obvious errors, but I expect that there are errors of grammar and possibly content that I did not discover before finalizing the note.

## 2019-10-01 ENCOUNTER — OFFICE VISIT (OUTPATIENT)
Dept: MEDICAL GROUP | Facility: PHYSICIAN GROUP | Age: 69
End: 2019-10-01
Payer: MEDICARE

## 2019-10-01 VITALS
HEIGHT: 64 IN | RESPIRATION RATE: 20 BRPM | HEART RATE: 74 BPM | OXYGEN SATURATION: 96 % | WEIGHT: 154.54 LBS | SYSTOLIC BLOOD PRESSURE: 110 MMHG | TEMPERATURE: 97.5 F | DIASTOLIC BLOOD PRESSURE: 64 MMHG | BODY MASS INDEX: 26.38 KG/M2

## 2019-10-01 DIAGNOSIS — F41.9 ANXIETY: ICD-10-CM

## 2019-10-01 DIAGNOSIS — G25.81 RLS (RESTLESS LEGS SYNDROME): ICD-10-CM

## 2019-10-01 DIAGNOSIS — F99 INSOMNIA DUE TO OTHER MENTAL DISORDER: ICD-10-CM

## 2019-10-01 DIAGNOSIS — F51.05 INSOMNIA DUE TO OTHER MENTAL DISORDER: ICD-10-CM

## 2019-10-01 DIAGNOSIS — I10 ESSENTIAL HYPERTENSION: ICD-10-CM

## 2019-10-01 DIAGNOSIS — Z23 NEED FOR VACCINATION: ICD-10-CM

## 2019-10-01 PROCEDURE — 90662 IIV NO PRSV INCREASED AG IM: CPT | Performed by: FAMILY MEDICINE

## 2019-10-01 PROCEDURE — 99214 OFFICE O/P EST MOD 30 MIN: CPT | Mod: 25 | Performed by: FAMILY MEDICINE

## 2019-10-01 PROCEDURE — G0008 ADMIN INFLUENZA VIRUS VAC: HCPCS | Performed by: FAMILY MEDICINE

## 2019-10-02 NOTE — PROGRESS NOTES
Complaint: f/u anxiety, sleep.     Subjective:     Raffaele Burton is a 68 y.o. male here today for f/u    RLS (restless legs syndrome)  Well controlled on Sinemet.    Anxiety  Still having trouble with anxiety. Denies any depression. Spends most of his time alone.    Insomnia due to other mental disorder  Tried taking 100 mg trazodone, made him too sleepy. Sleeps soundly on 50 mg.    Essential hypertension  Controlled on Inderal LA 80 mg.     No other concerns or complaints today.    Current medicines (including changes today)  Current Outpatient Medications   Medication Sig Dispense Refill   • citalopram (CELEXA) 10 MG tablet Take 1 Tab by mouth every day. 30 Tab 2   • traZODone (DESYREL) 50 MG Tab Take 1 Tab by mouth every bedtime. 30 Tab 3   • acyclovir (ZOVIRAX) 400 MG tablet TAKE 1 TAB BY MOUTH 2 TIMES DAILY WITH MEALS AS NEEDED. 200 Tab 1   • carbidopa-levodopa (SINEMET)  MG Tab Take 1 Tab by mouth every day. 90 Tab 1   • propranolol CR (INDERAL LA) 80 MG CAPSULE SR 24 HR      • methadone (DOLOPHINE) 5 MG Tab Take 5 mg by mouth every 12 hours. FOR 30 DAYS  0   • albuterol 108 (90 Base) MCG/ACT Aero Soln inhalation aerosol Inhale 2 Puffs by mouth every 6 hours as needed.       No current facility-administered medications for this visit.      He  has a past medical history of Anxiety, Asthma, Herpes simplex, Hypertension, Restless leg syndrome, and Shoulder pain, right.    Health Maintenance: needs flu shot      Allergies: Benadryl [diphenhydramine] and Hydroxyzine    Current Outpatient Medications Ordered in Epic   Medication Sig Dispense Refill   • citalopram (CELEXA) 10 MG tablet Take 1 Tab by mouth every day. 30 Tab 2   • traZODone (DESYREL) 50 MG Tab Take 1 Tab by mouth every bedtime. 30 Tab 3   • acyclovir (ZOVIRAX) 400 MG tablet TAKE 1 TAB BY MOUTH 2 TIMES DAILY WITH MEALS AS NEEDED. 200 Tab 1   • carbidopa-levodopa (SINEMET)  MG Tab Take 1 Tab by mouth every day. 90 Tab 1   • propranolol CR  "(INDERAL LA) 80 MG CAPSULE SR 24 HR      • methadone (DOLOPHINE) 5 MG Tab Take 5 mg by mouth every 12 hours. FOR 30 DAYS  0   • albuterol 108 (90 Base) MCG/ACT Aero Soln inhalation aerosol Inhale 2 Puffs by mouth every 6 hours as needed.       No current Epic-ordered facility-administered medications on file.        Past Medical History:   Diagnosis Date   • Anxiety    • Asthma    • Herpes simplex    • Hypertension    • Restless leg syndrome    • Shoulder pain, right        Past Surgical History:   Procedure Laterality Date   • ACL RECONSTRUCTION Left    • ARTHROPLASTY, SHOULDER Right    • CHOLECYSTECTOMY     • HERNIA REPAIR Left    • TONSILLECTOMY AND ADENOIDECTOMY     • VASECTOMY         Social History     Tobacco Use   • Smoking status: Former Smoker     Types: Cigarettes     Last attempt to quit: 1/3/2014     Years since quittin.7   • Smokeless tobacco: Never Used   Substance Use Topics   • Alcohol use: No   • Drug use: No       Social History     Social History Narrative   • Not on file       Family History   Problem Relation Age of Onset   • Cancer Mother         breast   • Diabetes Mother    • Other Sister         Crohn's   • Alcohol/Drug Brother    • Hypertension Sister    • Alcohol/Drug Brother    • Heart Disease Brother          ROS Positive for free-floating anxiety  Patient denies any fever, chills, unintentional weight gain/loss, fatigue, stroke symptoms, dizziness, headache, nasal congestion, sore-throat, cough, heartburn, chest pain, difficulty breathing, abdominal discomfort, diarrhea/constipation, burning with urination or frequency, joint or back pain, skin rashes, depression.       Objective:     /64   Pulse 74   Temp 36.4 °C (97.5 °F) (Temporal)   Resp 20   Ht 1.626 m (5' 4\")   Wt 70.1 kg (154 lb 8.7 oz)   SpO2 96%  Body mass index is 26.53 kg/m².   Physical Exam:  Constitutional: Alert, no distress.  Psych: Alert and oriented x3, appropriate affect and mood.        Assessment and " Plan:   The following treatment plan was discussed    1. Anxiety  Continue current dose Celexa, re-assess 1 month, will probably need higher dose. Encouraged patient to get out more.    2. Need for vaccination  - INFLUENZA VACCINE, HIGH DOSE (65+ ONLY)    3. RLS (restless legs syndrome)  Controlled on meds.    4. Essential hypertension  Controlled on meds.    5. Insomnia due to other mental disorder  Improved on meds.      Followup: Return in about 4 weeks (around 10/29/2019).    Please note that this dictation was created using voice recognition software. I have made every reasonable attempt to correct obvious errors, but I expect that there are errors of grammar and possibly content that I did not discover before finalizing the note.

## 2019-11-05 ENCOUNTER — OFFICE VISIT (OUTPATIENT)
Dept: MEDICAL GROUP | Facility: PHYSICIAN GROUP | Age: 69
End: 2019-11-05
Payer: MEDICARE

## 2019-11-05 VITALS
HEART RATE: 63 BPM | HEIGHT: 64 IN | BODY MASS INDEX: 26.35 KG/M2 | DIASTOLIC BLOOD PRESSURE: 60 MMHG | TEMPERATURE: 98.6 F | OXYGEN SATURATION: 96 % | WEIGHT: 154.32 LBS | SYSTOLIC BLOOD PRESSURE: 100 MMHG

## 2019-11-05 DIAGNOSIS — F41.9 ANXIETY: ICD-10-CM

## 2019-11-05 DIAGNOSIS — F51.05 INSOMNIA DUE TO OTHER MENTAL DISORDER: ICD-10-CM

## 2019-11-05 DIAGNOSIS — F99 INSOMNIA DUE TO OTHER MENTAL DISORDER: ICD-10-CM

## 2019-11-05 PROBLEM — M25.511 CHRONIC RIGHT SHOULDER PAIN: Status: ACTIVE | Noted: 2019-11-05

## 2019-11-05 PROBLEM — G89.29 ENCOUNTER FOR CHRONIC PAIN MANAGEMENT: Status: RESOLVED | Noted: 2019-08-29 | Resolved: 2019-11-05

## 2019-11-05 PROBLEM — G89.29 CHRONIC RIGHT SHOULDER PAIN: Status: ACTIVE | Noted: 2019-11-05

## 2019-11-05 PROCEDURE — 99213 OFFICE O/P EST LOW 20 MIN: CPT | Performed by: FAMILY MEDICINE

## 2019-11-05 RX ORDER — CITALOPRAM 20 MG/1
20 TABLET ORAL DAILY
Qty: 30 TAB | Refills: 2 | Status: SHIPPED | OUTPATIENT
Start: 2019-11-05 | End: 2020-01-30

## 2019-11-05 RX ORDER — TRAZODONE HYDROCHLORIDE 100 MG/1
100 TABLET ORAL
Qty: 30 TAB | Refills: 2 | Status: SHIPPED | OUTPATIENT
Start: 2019-11-05 | End: 2020-05-26

## 2019-11-05 NOTE — PROGRESS NOTES
Complaint: fu anxiety, insomnia     Subjective:     Raffaele Burton is a 68 y.o. male here today for f/u.    Chronic right shoulder pain  Followed by Dr. Alcazar. Worsening. Thinks some pulled on his arm during surgery.    RLS (restless legs syndrome)  Tried Sinemet, caused side-effects, stopped. No more cramps.    Anxiety  Dealing with some painful issues from his childhood, family issues. Has not noticed much of a difference of Celexa 10 mg.    Insomnia due to other mental disorder  Trazodone not helping him get to sleep at this dose.     No other concerns or complaints.    Current medicines (including changes today)  Current Outpatient Medications   Medication Sig Dispense Refill   • traZODone (DESYREL) 100 MG Tab Take 1 Tab by mouth every bedtime. 30 Tab 2   • citalopram (CELEXA) 20 MG Tab Take 1 Tab by mouth every day. 30 Tab 2   • acyclovir (ZOVIRAX) 400 MG tablet TAKE 1 TAB BY MOUTH 2 TIMES DAILY WITH MEALS AS NEEDED. 200 Tab 1   • propranolol CR (INDERAL LA) 80 MG CAPSULE SR 24 HR      • methadone (DOLOPHINE) 5 MG Tab Take 5 mg by mouth every 12 hours. FOR 30 DAYS  0   • albuterol 108 (90 Base) MCG/ACT Aero Soln inhalation aerosol Inhale 2 Puffs by mouth every 6 hours as needed.       No current facility-administered medications for this visit.      He  has a past medical history of Anxiety, Asthma, Herpes simplex, Hypertension, Restless leg syndrome, and Shoulder pain, right.    Health Maintenance:       Allergies: Benadryl [diphenhydramine] and Hydroxyzine    Current Outpatient Medications Ordered in Epic   Medication Sig Dispense Refill   • traZODone (DESYREL) 100 MG Tab Take 1 Tab by mouth every bedtime. 30 Tab 2   • citalopram (CELEXA) 20 MG Tab Take 1 Tab by mouth every day. 30 Tab 2   • acyclovir (ZOVIRAX) 400 MG tablet TAKE 1 TAB BY MOUTH 2 TIMES DAILY WITH MEALS AS NEEDED. 200 Tab 1   • propranolol CR (INDERAL LA) 80 MG CAPSULE SR 24 HR      • methadone (DOLOPHINE) 5 MG Tab Take 5 mg by mouth every  "12 hours. FOR 30 DAYS  0   • albuterol 108 (90 Base) MCG/ACT Aero Soln inhalation aerosol Inhale 2 Puffs by mouth every 6 hours as needed.       No current Epic-ordered facility-administered medications on file.        Past Medical History:   Diagnosis Date   • Anxiety    • Asthma    • Herpes simplex    • Hypertension    • Restless leg syndrome    • Shoulder pain, right        Past Surgical History:   Procedure Laterality Date   • ACL RECONSTRUCTION Left    • ARTHROPLASTY, SHOULDER Right    • CHOLECYSTECTOMY     • HERNIA REPAIR Left    • TONSILLECTOMY AND ADENOIDECTOMY     • VASECTOMY         Social History     Tobacco Use   • Smoking status: Former Smoker     Packs/day: 0.00     Types: Cigarettes     Last attempt to quit: 1/3/2014     Years since quittin.8   • Smokeless tobacco: Never Used   Substance Use Topics   • Alcohol use: No   • Drug use: No       Social History     Patient does not qualify to have social determinant information on file (likely too young).   Social History Narrative   • Not on file       Family History   Problem Relation Age of Onset   • Cancer Mother         breast   • Diabetes Mother    • Other Sister         Crohn's   • Alcohol/Drug Brother    • Hypertension Sister    • Alcohol/Drug Brother    • Heart Disease Brother          ROS Positive for depression/anxiety and trouble getting to sleep, pain in right shoulder.  Patient denies any fever, chills, unintentional weight gain/loss, fatigue, stroke symptoms, dizziness, headache, nasal congestion, sore-throat, cough, heartburn, chest pain, difficulty breathing, abdominal discomfort, diarrhea/constipation, burning with urination or frequency, joint or back pain, skin rashes, depression or anxiety.       Objective:     /60 (BP Location: Left arm, Patient Position: Sitting, BP Cuff Size: Adult)   Pulse 63   Temp 37 °C (98.6 °F) (Temporal)   Ht 1.626 m (5' 4\")   Wt 70 kg (154 lb 5.2 oz)   SpO2 96%  Body mass index is 26.49 kg/m². "   Physical Exam:  Constitutional: Alert, no distress.  No formal exam    Assessment and Plan:   The following treatment plan was discussed    1. Anxiety  Will increase his Celexa to 20 mg/day. Patient to report on efficacy. Might need change to something else like Wellbutrin.    2. Insomnia due to other mental disorder  Will increase his trazodone to 100 mg. If not effective, will switch to Remeron.      Followup: Return in about 3 months (around 2/5/2020), or if symptoms worsen or fail to improve.    Please note that this dictation was created using voice recognition software. I have made every reasonable attempt to correct obvious errors, but I expect that there are errors of grammar and possibly content that I did not discover before finalizing the note.

## 2019-12-09 RX ORDER — CARBIDOPA/LEVODOPA 25MG-250MG
TABLET ORAL
Qty: 90 TAB | Refills: 1 | Status: SHIPPED | OUTPATIENT
Start: 2019-12-09 | End: 2020-05-26

## 2020-05-25 ENCOUNTER — OFFICE VISIT (OUTPATIENT)
Dept: URGENT CARE | Facility: CLINIC | Age: 70
End: 2020-05-25
Payer: MEDICARE

## 2020-05-25 VITALS
DIASTOLIC BLOOD PRESSURE: 80 MMHG | HEART RATE: 68 BPM | SYSTOLIC BLOOD PRESSURE: 116 MMHG | HEIGHT: 64 IN | WEIGHT: 145.3 LBS | BODY MASS INDEX: 24.81 KG/M2 | OXYGEN SATURATION: 94 % | RESPIRATION RATE: 16 BRPM | TEMPERATURE: 97.6 F

## 2020-05-25 DIAGNOSIS — K08.89 PAIN IN TOOTH: ICD-10-CM

## 2020-05-25 DIAGNOSIS — R50.9 FEVER, UNSPECIFIED FEVER CAUSE: ICD-10-CM

## 2020-05-25 LAB
APPEARANCE UR: CLEAR
BILIRUB UR STRIP-MCNC: NEGATIVE MG/DL
COLOR UR AUTO: NORMAL
FLUAV+FLUBV AG SPEC QL IA: NEGATIVE
GLUCOSE UR STRIP.AUTO-MCNC: NEGATIVE MG/DL
INT CON NEG: NORMAL
INT CON POS: NORMAL
KETONES UR STRIP.AUTO-MCNC: NORMAL MG/DL
LEUKOCYTE ESTERASE UR QL STRIP.AUTO: NEGATIVE
NITRITE UR QL STRIP.AUTO: NEGATIVE
PH UR STRIP.AUTO: 6 [PH] (ref 5–8)
PROT UR QL STRIP: 30 MG/DL
RBC UR QL AUTO: NORMAL
SP GR UR STRIP.AUTO: >=1.03
UROBILINOGEN UR STRIP-MCNC: 0.2 MG/DL

## 2020-05-25 PROCEDURE — 99214 OFFICE O/P EST MOD 30 MIN: CPT | Performed by: PHYSICIAN ASSISTANT

## 2020-05-25 PROCEDURE — 87804 INFLUENZA ASSAY W/OPTIC: CPT | Performed by: PHYSICIAN ASSISTANT

## 2020-05-25 PROCEDURE — 81002 URINALYSIS NONAUTO W/O SCOPE: CPT | Performed by: PHYSICIAN ASSISTANT

## 2020-05-25 RX ORDER — AMOXICILLIN 875 MG/1
875 TABLET, COATED ORAL 2 TIMES DAILY
Qty: 20 TAB | Refills: 0 | Status: SHIPPED | OUTPATIENT
Start: 2020-05-25 | End: 2020-06-04

## 2020-05-25 ASSESSMENT — FIBROSIS 4 INDEX: FIB4 SCORE: 1.26

## 2020-05-25 ASSESSMENT — ENCOUNTER SYMPTOMS
NAUSEA: 1
CHILLS: 1
FEVER: 1
PALPITATIONS: 0
DIARRHEA: 1
ABDOMINAL PAIN: 0
VOMITING: 0
FLANK PAIN: 0
MYALGIAS: 1
TINGLING: 0
WHEEZING: 0
COUGH: 0
NECK PAIN: 0
HEADACHES: 0
SENSORY CHANGE: 0
SHORTNESS OF BREATH: 0
DIZZINESS: 0
SORE THROAT: 0
SPUTUM PRODUCTION: 0
FOCAL WEAKNESS: 0
BLOOD IN STOOL: 0

## 2020-05-25 NOTE — PROGRESS NOTES
Subjective:      Raffaele Burton is a 69 y.o. male who presents with Fever (x 3 days; chills and nausea;)            Fever    This is a new problem. Episode onset: 3 days. Patient reports subjective fevers, chills x 3 days. The problem occurs intermittently. The problem has been waxing and waning. His temperature was unmeasured prior to arrival. Associated symptoms include diarrhea (chronic diarrhea s/p Cholecystectomy ), muscle aches (generalized), nausea and sleepiness. Pertinent negatives include no abdominal pain, chest pain, congestion, coughing, ear pain, headaches, rash, sore throat, urinary pain, vomiting or wheezing. Associated symptoms comments: No shortness of breath . He has tried NSAIDs for the symptoms. The treatment provided moderate relief.   Risk factors: no contaminated food, no recent sickness, no recent travel and no sick contacts          Past Medical History:   Diagnosis Date   • Anxiety    • Asthma    • Herpes simplex    • Hypertension    • Restless leg syndrome    • Shoulder pain, right        Past Surgical History:   Procedure Laterality Date   • ACL RECONSTRUCTION Left    • ARTHROPLASTY, SHOULDER Right    • CHOLECYSTECTOMY     • HERNIA REPAIR Left    • TONSILLECTOMY AND ADENOIDECTOMY     • VASECTOMY         Family History   Problem Relation Age of Onset   • Cancer Mother         breast   • Diabetes Mother    • Other Sister         Crohn's   • Alcohol/Drug Brother    • Hypertension Sister    • Alcohol/Drug Brother    • Heart Disease Brother        Allergies   Allergen Reactions   • Benadryl [Diphenhydramine]    • Hydroxyzine      psychosis       Medications, Allergies, and current problem list reviewed today in Epic    Review of Systems   Constitutional: Positive for chills, fever and malaise/fatigue.   HENT: Negative for congestion, ear discharge, ear pain and sore throat.         Dental pain    Respiratory: Negative for cough, sputum production, shortness of breath and wheezing.     "  Cardiovascular: Negative for chest pain, palpitations and leg swelling.   Gastrointestinal: Positive for diarrhea (chronic diarrhea s/p Cholecystectomy ) and nausea. Negative for abdominal pain, blood in stool and vomiting.   Genitourinary: Negative for dysuria, flank pain, frequency, hematuria and urgency.   Musculoskeletal: Positive for myalgias (generalzied ). Negative for neck pain.   Skin: Negative for rash.   Neurological: Negative for dizziness, tingling, sensory change, focal weakness and headaches.     All other systems reviewed and are negative.        Objective:     /80 (BP Location: Right arm, Patient Position: Sitting)   Pulse 68   Temp 36.4 °C (97.6 °F) (Temporal)   Resp 16   Ht 1.626 m (5' 4\")   Wt 65.9 kg (145 lb 4.8 oz)   SpO2 94%   BMI 24.94 kg/m²      Physical Exam  Constitutional:       General: He is not in acute distress.     Appearance: He is ill-appearing (slightly ill appearing ) and diaphoretic.   HENT:      Head: Normocephalic and atraumatic.      Nose: Nose normal. No congestion or rhinorrhea.      Mouth/Throat:      Mouth: Mucous membranes are moist.      Dentition: Abnormal dentition. Dental caries present.      Pharynx: No posterior oropharyngeal erythema.     Eyes:      Conjunctiva/sclera: Conjunctivae normal.   Neck:      Musculoskeletal: No neck rigidity or pain with movement.   Cardiovascular:      Rate and Rhythm: Normal rate and regular rhythm.      Heart sounds: Normal heart sounds. No murmur. No friction rub. No gallop.    Pulmonary:      Effort: Pulmonary effort is normal. No respiratory distress.      Breath sounds: Normal breath sounds. No wheezing, rhonchi or rales.   Musculoskeletal: Normal range of motion.   Lymphadenopathy:      Cervical: No cervical adenopathy.   Skin:     General: Skin is warm.      Findings: No rash.   Neurological:      General: No focal deficit present.      Mental Status: He is alert and oriented to person, place, and time. "   Psychiatric:         Mood and Affect: Mood normal.         Behavior: Behavior normal.         Thought Content: Thought content normal.         Judgment: Judgment normal.             Lab Results   Component Value Date/Time    POCCOLOR Dark Yellow 05/25/2020 10:28 AM    POCAPPEAR Clear 05/25/2020 10:28 AM    POCLEUKEST Negative 05/25/2020 10:28 AM    POCNITRITE Negative 05/25/2020 10:28 AM    POCUROBILIGE 0.2 05/25/2020 10:28 AM    POCPROTEIN 30 05/25/2020 10:28 AM    POCURPH 6.0 05/25/2020 10:28 AM    POCBLOOD Trace-intact 05/25/2020 10:28 AM    POCSPGRV >=1.030 05/25/2020 10:28 AM    POCKETONES Trace 05/25/2020 10:28 AM    POCBILIRUBIN Negative 05/25/2020 10:28 AM    POCGLUCUA Negative 05/25/2020 10:28 AM          poct Influenza- negative        Assessment/Plan:     1. Fever, unspecified fever cause  POCT Influenza A/B    POCT Urinalysis   2. Pain in tooth  amoxicillin (AMOXIL) 875 MG tablet         Patient has vague symptoms including subjective fever, diaphoresis, nausea, body aches.   He has no respiratory symptoms at this time.   UA did not show signs of infection.  Influenza testing is negative.     Possibly infection from tooth vs viral syndrome.  Will cover tooth infection with Amoxicillin.  Recommend patient contact local Health Department to inquire about COVID-19 testing as I believe he meets criteria for testing.    Differential diagnoses, Supportive care, and indications for immediate follow-up discussed with patient.   Pathogenesis of diagnosis discussed including typical length and natural progression.   Instructed to return to clinic or nearest emergency department for any change in condition, further concerns, or worsening of symptoms.    The patient demonstrated a good understanding and agreed with the treatment plan.    Tayla Leahy P.A.-C.

## 2020-05-26 ENCOUNTER — OFFICE VISIT (OUTPATIENT)
Dept: MEDICAL GROUP | Facility: PHYSICIAN GROUP | Age: 70
End: 2020-05-26
Payer: MEDICARE

## 2020-05-26 ENCOUNTER — TELEPHONE (OUTPATIENT)
Dept: HEALTH INFORMATION MANAGEMENT | Facility: OTHER | Age: 70
End: 2020-05-26

## 2020-05-26 VITALS
RESPIRATION RATE: 16 BRPM | OXYGEN SATURATION: 97 % | BODY MASS INDEX: 24.69 KG/M2 | SYSTOLIC BLOOD PRESSURE: 130 MMHG | HEIGHT: 64 IN | HEART RATE: 62 BPM | TEMPERATURE: 97.6 F | DIASTOLIC BLOOD PRESSURE: 72 MMHG | WEIGHT: 144.62 LBS

## 2020-05-26 DIAGNOSIS — S02.5XXA CLOSED FRACTURE OF TOOTH, INITIAL ENCOUNTER: ICD-10-CM

## 2020-05-26 DIAGNOSIS — Z12.12 SCREENING FOR COLORECTAL CANCER: ICD-10-CM

## 2020-05-26 DIAGNOSIS — Z12.11 SCREENING FOR COLORECTAL CANCER: ICD-10-CM

## 2020-05-26 PROBLEM — K08.89 TOOTHACHE: Status: ACTIVE | Noted: 2020-05-26

## 2020-05-26 PROCEDURE — 99213 OFFICE O/P EST LOW 20 MIN: CPT | Performed by: FAMILY MEDICINE

## 2020-05-26 RX ORDER — CITALOPRAM 40 MG/1
40 TABLET ORAL DAILY
Qty: 30 TAB | Refills: 2 | Status: SHIPPED | OUTPATIENT
Start: 2020-05-26 | End: 2020-06-17

## 2020-05-26 ASSESSMENT — PATIENT HEALTH QUESTIONNAIRE - PHQ9
CLINICAL INTERPRETATION OF PHQ2 SCORE: 2
SUM OF ALL RESPONSES TO PHQ QUESTIONS 1-9: 7
5. POOR APPETITE OR OVEREATING: 0 - NOT AT ALL

## 2020-05-26 ASSESSMENT — FIBROSIS 4 INDEX: FIB4 SCORE: 1.26

## 2020-05-26 NOTE — ASSESSMENT & PLAN NOTE
Has been experiencing pain in 2nd molar left lower jaw x months. Has not seen dentist. Seen the other day at , placed on oral AB.

## 2020-05-26 NOTE — PROGRESS NOTES
Complaint: Toothache     Subjective:     Raffaele Burton is a 69 y.o. male here today for     Toothache  Has been experiencing pain in 2nd molar left lower jaw x months. Has not seen dentist. Seen the other day at , placed on oral AB.      Goes to group therapy sessions, doing well. Pain managed with methadone.    Current medicines (including changes today)  Current Outpatient Medications   Medication Sig Dispense Refill   • citalopram (CELEXA) 40 MG Tab Take 1 Tab by mouth every day. 30 Tab 2   • amoxicillin (AMOXIL) 875 MG tablet Take 1 Tab by mouth 2 times a day for 10 days. 20 Tab 0   • acyclovir (ZOVIRAX) 400 MG tablet TAKE 1 TAB BY MOUTH 2 TIMES DAILY WITH MEALS AS NEEDED. 180 Tab 2   • propranolol CR (INDERAL LA) 80 MG CAPSULE SR 24 HR TAKE 1 CAPSULE BY MOUTH EVERY DAY 90 Cap 1   • methadone (DOLOPHINE) 5 MG Tab Take 5 mg by mouth every 12 hours. FOR 30 DAYS  0   • albuterol 108 (90 Base) MCG/ACT Aero Soln inhalation aerosol Inhale 2 Puffs by mouth every 6 hours as needed.       No current facility-administered medications for this visit.      He  has a past medical history of Anxiety, Asthma, Herpes simplex, Hypertension, Restless leg syndrome, and Shoulder pain, right.    Health Maintenance:       Allergies: Benadryl [diphenhydramine] and Hydroxyzine    Current Outpatient Medications Ordered in Epic   Medication Sig Dispense Refill   • citalopram (CELEXA) 40 MG Tab Take 1 Tab by mouth every day. 30 Tab 2   • amoxicillin (AMOXIL) 875 MG tablet Take 1 Tab by mouth 2 times a day for 10 days. 20 Tab 0   • acyclovir (ZOVIRAX) 400 MG tablet TAKE 1 TAB BY MOUTH 2 TIMES DAILY WITH MEALS AS NEEDED. 180 Tab 2   • propranolol CR (INDERAL LA) 80 MG CAPSULE SR 24 HR TAKE 1 CAPSULE BY MOUTH EVERY DAY 90 Cap 1   • methadone (DOLOPHINE) 5 MG Tab Take 5 mg by mouth every 12 hours. FOR 30 DAYS  0   • albuterol 108 (90 Base) MCG/ACT Aero Soln inhalation aerosol Inhale 2 Puffs by mouth every 6 hours as needed.       No  "current [facility]-ordered facility-administered medications on file.        Past Medical History:   Diagnosis Date   • Anxiety    • Asthma    • Herpes simplex    • Hypertension    • Restless leg syndrome    • Shoulder pain, right        Past Surgical History:   Procedure Laterality Date   • ACL RECONSTRUCTION Left    • ARTHROPLASTY, SHOULDER Right    • CHOLECYSTECTOMY     • HERNIA REPAIR Left    • TONSILLECTOMY AND ADENOIDECTOMY     • VASECTOMY         Social History     Tobacco Use   • Smoking status: Current Some Day Smoker     Packs/day: 0.00     Types: Cigarettes     Last attempt to quit: 1/3/2014     Years since quittin.3   • Smokeless tobacco: Never Used   Substance Use Topics   • Alcohol use: No   • Drug use: No       Social History     Social History Narrative   • Not on file       Family History   Problem Relation Age of Onset   • Cancer Mother         breast   • Diabetes Mother    • Other Sister         Crohn's   • Alcohol/Drug Brother    • Hypertension Sister    • Alcohol/Drug Brother    • Heart Disease Brother          ROS Positive for toothache, poor appetite and weight loss due to toothache.  Patient denies any fever, chills,  fatigue, stroke symptoms, dizziness, headache, nasal congestion, sore-throat, cough, heartburn, chest pain, difficulty breathing, abdominal discomfort, diarrhea/constipation, burning with urination or frequency, joint or back pain, skin rashes, depression or anxiety.       Objective:     /72 (BP Location: Right arm, Patient Position: Sitting, BP Cuff Size: Adult)   Pulse 62   Temp 36.4 °C (97.6 °F) (Temporal)   Resp 16   Ht 1.626 m (5' 4\")   Wt 65.6 kg (144 lb 10 oz)   SpO2 97%  Body mass index is 24.82 kg/m².   Physical Exam:  Constitutional: Alert, no distress.  ENT: fracture thru posterior past of tooth #18.  Psych: Alert and oriented x3, appropriate affect and mood.        Assessment and Plan:   The following treatment plan was discussed    1. Closed fracture of " tooth, initial encounter  Recommended local dentist, Oragel topically for pain.    2. Screening for colorectal cancer  - COLOGUARD (FIT DNA)      Followup: Return if symptoms worsen or fail to improve.    Please note that this dictation was created using voice recognition software. I have made every reasonable attempt to correct obvious errors, but I expect that there are errors of grammar and possibly content that I did not discover before finalizing the note.

## 2020-05-26 NOTE — TELEPHONE ENCOUNTER
1. Caller Name: Raffaele Burton                 Call Back Number: [phone]  [facility] PCP or Specialty Provider: Yes         2.  Does patient have any new or worsening symptoms of respiratory illness? No.  He says that he has an infected tooth and that he has been sweating but has not had any fevers and temperatures have been <99.0    3.  Does patient have any comoribidities? None     4.  Has the patient traveled in the last 14 days OR had any known contact with someone who is suspected or confirmed to have COVID-19?  No.    5. Disposition: Cleared by RN Triage as potential is low for COVID-19; OK to keep/schedule appointment    Note routed to [facility] Provider: RASHEL only.

## 2020-07-14 RX ORDER — PROPRANOLOL HYDROCHLORIDE 80 MG/1
80 CAPSULE, EXTENDED RELEASE ORAL
Qty: 100 CAP | Refills: 1 | Status: SHIPPED | OUTPATIENT
Start: 2020-07-14 | End: 2020-12-08

## 2020-08-12 RX ORDER — CITALOPRAM 40 MG/1
40 TABLET ORAL
Qty: 30 TAB | Refills: 2 | Status: SHIPPED | OUTPATIENT
Start: 2020-08-12 | End: 2020-09-22

## 2020-08-19 ENCOUNTER — OFFICE VISIT (OUTPATIENT)
Dept: MEDICAL GROUP | Facility: PHYSICIAN GROUP | Age: 70
End: 2020-08-19
Payer: MEDICARE

## 2020-08-19 VITALS
HEART RATE: 66 BPM | SYSTOLIC BLOOD PRESSURE: 118 MMHG | HEIGHT: 64 IN | BODY MASS INDEX: 25.27 KG/M2 | DIASTOLIC BLOOD PRESSURE: 80 MMHG | TEMPERATURE: 98.6 F | WEIGHT: 148 LBS | OXYGEN SATURATION: 98 % | RESPIRATION RATE: 14 BRPM

## 2020-08-19 DIAGNOSIS — F32.A ANXIETY AND DEPRESSION: ICD-10-CM

## 2020-08-19 DIAGNOSIS — I10 ESSENTIAL HYPERTENSION: ICD-10-CM

## 2020-08-19 DIAGNOSIS — J45.20 MILD INTERMITTENT ASTHMA WITHOUT COMPLICATION: ICD-10-CM

## 2020-08-19 DIAGNOSIS — F41.9 ANXIETY AND DEPRESSION: ICD-10-CM

## 2020-08-19 DIAGNOSIS — E55.9 VITAMIN D DEFICIENCY: ICD-10-CM

## 2020-08-19 DIAGNOSIS — Z87.09 HISTORY OF ASTHMA: ICD-10-CM

## 2020-08-19 DIAGNOSIS — Z12.5 SCREENING FOR PROSTATE CANCER: ICD-10-CM

## 2020-08-19 PROCEDURE — 99214 OFFICE O/P EST MOD 30 MIN: CPT | Performed by: NURSE PRACTITIONER

## 2020-08-19 RX ORDER — ALBUTEROL SULFATE 90 UG/1
2 AEROSOL, METERED RESPIRATORY (INHALATION) EVERY 6 HOURS PRN
Qty: 8.5 G | Refills: 1 | Status: SHIPPED | OUTPATIENT
Start: 2020-08-19

## 2020-08-19 RX ORDER — HYDROCODONE BITARTRATE AND ACETAMINOPHEN 5; 325 MG/1; MG/1
TABLET ORAL
COMMUNITY
Start: 2020-06-04 | End: 2020-08-19

## 2020-08-19 RX ORDER — IBUPROFEN 800 MG/1
800 TABLET ORAL
COMMUNITY
Start: 2020-06-04 | End: 2020-08-19

## 2020-08-19 ASSESSMENT — FIBROSIS 4 INDEX: FIB4 SCORE: 1.26

## 2020-08-19 NOTE — ASSESSMENT & PLAN NOTE
New to me.  Distant history of asthma.  No current symptoms.  At times needs inhaler use with smoking outside.  He denies shortness of breath or wheezing.

## 2020-08-19 NOTE — PROGRESS NOTES
Chief Complaint   Patient presents with   • Establish Care       HISTORY OF PRESENT ILLNESS: Patient is a 69 y.o. male, established patient who presents today to discuss medical problems as listed below:    Health Maintenance:  COMPLETED     Anxiety and depression  New to me.  This is a chronic issue.  Patient states well-controlled with citalopram 40 mg daily.  He denies suicidal ideation.  He is in group therapy weekly, helpful.  He needs no refills today.    Essential hypertension  New to me.  Chronic problem.  Well-controlled on propranolol 80 mg daily.  Denies CP, palpitations, dizziness, headaches, lower extremity swelling.    Mild intermittent asthma without complication  New to me.  Distant history of asthma.  No current symptoms.  At times needs inhaler use with smoking outside.  He denies shortness of breath or wheezing.      Patient Active Problem List    Diagnosis Date Noted   • Toothache 05/26/2020   • Chronic right shoulder pain 11/05/2019   • Anxiety and depression 09/17/2019   • Insomnia due to other mental disorder 09/17/2019   • Mild intermittent asthma without complication 07/09/2019   • RLS (restless legs syndrome) 07/09/2019   • Herpes simplex 07/09/2019   • Essential hypertension 01/03/2019        Allergies: Benadryl [diphenhydramine] and Hydroxyzine    Current Outpatient Medications   Medication Sig Dispense Refill   • albuterol 108 (90 Base) MCG/ACT Aero Soln inhalation aerosol Inhale 2 Puffs by mouth every 6 hours as needed. 8.5 g 1   • citalopram (CELEXA) 40 MG Tab Take 1 Tab by mouth every day. 30 Tab 2   • propranolol CR (INDERAL LA) 80 MG CAPSULE SR 24 HR Take 1 Cap by mouth every day. 100 Cap 1   • acyclovir (ZOVIRAX) 400 MG tablet TAKE 1 TAB BY MOUTH 2 TIMES DAILY WITH MEALS AS NEEDED. 180 Tab 2   • methadone (DOLOPHINE) 5 MG Tab Take 5 mg by mouth every 12 hours. FOR 30 DAYS  0     No current facility-administered medications for this visit.        Social History     Tobacco Use   •  Smoking status: Current Some Day Smoker     Packs/day: 0.50     Years: 12.00     Pack years: 6.00     Types: Cigarettes     Last attempt to quit: 1/3/2014     Years since quittin.6   • Smokeless tobacco: Never Used   Substance Use Topics   • Alcohol use: No   • Drug use: No     Social History     Social History Narrative   • Not on file       Family History   Problem Relation Age of Onset   • Cancer Mother         breast   • Diabetes Mother    • Other Sister         Crohn's   • Alcohol/Drug Brother    • Hypertension Sister    • Alcohol/Drug Brother    • Heart Disease Brother        Allergies, past medical history, past surgical history, family history, social history reviewed and updated.    Review of Systems:     - Constitutional: Negative for fever, chills, unexpected weight change, and fatigue/generalized weakness.     - HEENT: Negative for headaches, vision changes, hearing changes, ear pain, ear discharge, rhinorrhea, sinus congestion, sore throat, and neck pain.      - Respiratory: Negative for cough, sputum production, chest congestion, dyspnea, wheezing, and crackles.      - Cardiovascular: Negative for chest pain, palpitations, orthopnea, and bilateral lower extremity edema.     - Gastrointestinal: Negative for heartburn, nausea, vomiting, abdominal pain, hematochezia, melena, diarrhea, constipation, and greasy/foul-smelling stools.     - Genitourinary: Negative for dysuria, polyuria, hematuria, pyuria, urinary urgency, and urinary incontinence.    - Musculoskeletal: Negative for myalgias, back pain, and joint pain.     - Skin: Negative for rash, itching, cyanotic skin color change.     - Neurological: Negative for dizziness, tingling, tremors, focal sensory deficit, focal weakness and headaches.     - Endo/Heme/Allergies: Does not bruise/bleed easily.     - Psychiatric/Behavioral: Negative for depression, suicidal/homicidal ideation and memory loss.      All other systems reviewed and are  "negative    Exam:    /80 (BP Location: Left arm, Patient Position: Sitting, BP Cuff Size: Adult)   Pulse 66   Temp 37 °C (98.6 °F) (Temporal)   Resp 14   Ht 1.626 m (5' 4\")   Wt 67.1 kg (148 lb)   SpO2 98%   BMI 25.40 kg/m²  Body mass index is 25.4 kg/m².    Physical Exam:  Constitutional: Well-developed and well-nourished. Not diaphoretic. No distress.   Skin: Skin is warm and dry. No rash noted.  Head: Atraumatic without lesions.  Eyes: Conjunctivae and extraocular motions are normal. Pupils are equal, round, and reactive to light. No scleral icterus.   Ears:  External ears unremarkable. Tympanic membranes clear and intact.  Nose: Nares patent. Septum midline. Turbinates without erythema nor edema. No discharge.   Mouth/Throat: Dentition is normal. Tongue normal. Oropharynx is clear and moist. Posterior pharynx without erythema or exudates.  Neck: Supple, trachea midline. Normal range of motion. No thyromegaly present. No lymphadenopathy--cervical or supraclavicular.  Cardiovascular: Regular rate and rhythm, S1 and S2 without murmur, rubs, or gallops.    Chest: Effort normal. Clear to auscultation throughout. No adventitious sounds. No CVA tenderness.  Abdomen: Soft, non tender, and without distention. Active bowel sounds in all four quadrants. No rebound, guarding, masses or HSM.  : Negative for dysuria, polyuria, hematuria, pyuria, urinary urgency, and urinary incontinence.  Extremities: No cyanosis, clubbing, erythema, nor edema. Distal pulses intact and symmetric.   Musculoskeletal: All major joints AROM full in all directions without pain.  Neurological: Alert and oriented x 3. DTRs 2+/3 and symmetric. No cranial nerve deficit. 5/5 myotomes. Sensation intact. Negative Rhomberg.  Psychiatric:  Behavior, mood, and affect are appropriate.  MA/nursing note and vitals reviewed.    LABS: 2019  results reviewed and discussed with the patient, questions answered.    Assessment/Plan:  1. History of " asthma  Stable.  Refills were asked inhaler given in case of need.  - albuterol 108 (90 Base) MCG/ACT Aero Soln inhalation aerosol; Inhale 2 Puffs by mouth every 6 hours as needed.  Dispense: 8.5 g; Refill: 1    2. Screening for prostate cancer  - PROSTATE SPECIFIC AG SCREENING; Future    3. Essential hypertension  Well-controlled with current regimen.  Discussed the possible association with beta-blockers and depression.  Patient declines the need of switching beta-blocker to an alternative blood pressure medication at this time.  - CBC WITH DIFFERENTIAL; Future  - Comp Metabolic Panel; Future  - Lipid Profile; Future    4. Vitamin D deficiency  - VITAMIN D,25 HYDROXY; Future    5. Anxiety and depression  Well-controlled on current regimen.  - VITAMIN D,25 HYDROXY; Future    6. Mild intermittent asthma without complication  Stable       Discussed with patient possible alternative diagnoses, pt is to take all medications as prescribed. If symptoms persist FU w/PCP, if symptoms worsen go to emergency room. If experiencing any side effects from prescribed medications reports to the office immediately or go to emergency room.  Reviewed indication, dosage, usage and potential adverse effects of prescribed medications. Reviewed risks and benefits of treatment plan. Patient verbalizes understanding of all instruction and verbally agrees to plan.    Return if symptoms worsen or fail to improve.

## 2020-08-19 NOTE — ASSESSMENT & PLAN NOTE
New to me.  This is a chronic issue.  Patient states well-controlled with citalopram 40 mg daily.  He denies suicidal ideation.  He is in group therapy weekly, helpful.  He needs no refills today.

## 2020-08-19 NOTE — ASSESSMENT & PLAN NOTE
New to me.  Chronic problem.  Well-controlled on propranolol 80 mg daily.  Denies CP, palpitations, dizziness, headaches, lower extremity swelling.

## 2020-09-15 ENCOUNTER — HOSPITAL ENCOUNTER (OUTPATIENT)
Dept: LAB | Facility: MEDICAL CENTER | Age: 70
End: 2020-09-15
Attending: NURSE PRACTITIONER
Payer: MEDICARE

## 2020-09-15 DIAGNOSIS — F32.A ANXIETY AND DEPRESSION: ICD-10-CM

## 2020-09-15 DIAGNOSIS — E55.9 VITAMIN D DEFICIENCY: ICD-10-CM

## 2020-09-15 DIAGNOSIS — Z12.5 SCREENING FOR PROSTATE CANCER: ICD-10-CM

## 2020-09-15 DIAGNOSIS — F41.9 ANXIETY AND DEPRESSION: ICD-10-CM

## 2020-09-15 DIAGNOSIS — I10 ESSENTIAL HYPERTENSION: ICD-10-CM

## 2020-09-15 LAB
25(OH)D3 SERPL-MCNC: 32 NG/ML (ref 30–100)
ALBUMIN SERPL BCP-MCNC: 4.3 G/DL (ref 3.2–4.9)
ALBUMIN/GLOB SERPL: 1.6 G/DL
ALP SERPL-CCNC: 104 U/L (ref 30–99)
ALT SERPL-CCNC: 15 U/L (ref 2–50)
ANION GAP SERPL CALC-SCNC: 11 MMOL/L (ref 7–16)
AST SERPL-CCNC: 19 U/L (ref 12–45)
BASOPHILS # BLD AUTO: 0.8 % (ref 0–1.8)
BASOPHILS # BLD: 0.06 K/UL (ref 0–0.12)
BILIRUB SERPL-MCNC: 0.7 MG/DL (ref 0.1–1.5)
BUN SERPL-MCNC: 14 MG/DL (ref 8–22)
CALCIUM SERPL-MCNC: 9.5 MG/DL (ref 8.5–10.5)
CHLORIDE SERPL-SCNC: 100 MMOL/L (ref 96–112)
CHOLEST SERPL-MCNC: 175 MG/DL (ref 100–199)
CO2 SERPL-SCNC: 28 MMOL/L (ref 20–33)
CREAT SERPL-MCNC: 0.83 MG/DL (ref 0.5–1.4)
EOSINOPHIL # BLD AUTO: 0.25 K/UL (ref 0–0.51)
EOSINOPHIL NFR BLD: 3.2 % (ref 0–6.9)
ERYTHROCYTE [DISTWIDTH] IN BLOOD BY AUTOMATED COUNT: 42.9 FL (ref 35.9–50)
FASTING STATUS PATIENT QL REPORTED: NORMAL
GLOBULIN SER CALC-MCNC: 2.7 G/DL (ref 1.9–3.5)
GLUCOSE SERPL-MCNC: 97 MG/DL (ref 65–99)
HCT VFR BLD AUTO: 44.4 % (ref 42–52)
HDLC SERPL-MCNC: 32 MG/DL
HGB BLD-MCNC: 14.9 G/DL (ref 14–18)
IMM GRANULOCYTES # BLD AUTO: 0.05 K/UL (ref 0–0.11)
IMM GRANULOCYTES NFR BLD AUTO: 0.6 % (ref 0–0.9)
LDLC SERPL CALC-MCNC: 101 MG/DL
LYMPHOCYTES # BLD AUTO: 1.52 K/UL (ref 1–4.8)
LYMPHOCYTES NFR BLD: 19.3 % (ref 22–41)
MCH RBC QN AUTO: 31.7 PG (ref 27–33)
MCHC RBC AUTO-ENTMCNC: 33.6 G/DL (ref 33.7–35.3)
MCV RBC AUTO: 94.5 FL (ref 81.4–97.8)
MONOCYTES # BLD AUTO: 0.68 K/UL (ref 0–0.85)
MONOCYTES NFR BLD AUTO: 8.6 % (ref 0–13.4)
NEUTROPHILS # BLD AUTO: 5.33 K/UL (ref 1.82–7.42)
NEUTROPHILS NFR BLD: 67.5 % (ref 44–72)
NRBC # BLD AUTO: 0 K/UL
NRBC BLD-RTO: 0 /100 WBC
PLATELET # BLD AUTO: 204 K/UL (ref 164–446)
PMV BLD AUTO: 10.8 FL (ref 9–12.9)
POTASSIUM SERPL-SCNC: 4.7 MMOL/L (ref 3.6–5.5)
PROT SERPL-MCNC: 7 G/DL (ref 6–8.2)
PSA SERPL-MCNC: 1.16 NG/ML (ref 0–4)
RBC # BLD AUTO: 4.7 M/UL (ref 4.7–6.1)
SODIUM SERPL-SCNC: 139 MMOL/L (ref 135–145)
TRIGL SERPL-MCNC: 209 MG/DL (ref 0–149)
WBC # BLD AUTO: 7.9 K/UL (ref 4.8–10.8)

## 2020-09-15 PROCEDURE — 80061 LIPID PANEL: CPT

## 2020-09-15 PROCEDURE — 80053 COMPREHEN METABOLIC PANEL: CPT

## 2020-09-15 PROCEDURE — 85025 COMPLETE CBC W/AUTO DIFF WBC: CPT

## 2020-09-15 PROCEDURE — 82306 VITAMIN D 25 HYDROXY: CPT

## 2020-09-15 PROCEDURE — 36415 COLL VENOUS BLD VENIPUNCTURE: CPT | Mod: GA

## 2020-09-15 PROCEDURE — 84153 ASSAY OF PSA TOTAL: CPT | Mod: GA

## 2020-09-22 RX ORDER — CITALOPRAM 40 MG/1
TABLET ORAL
Qty: 90 TAB | Refills: 0 | Status: SHIPPED | OUTPATIENT
Start: 2020-09-22 | End: 2020-09-24 | Stop reason: SDUPTHER

## 2020-09-24 ENCOUNTER — OFFICE VISIT (OUTPATIENT)
Dept: MEDICAL GROUP | Facility: PHYSICIAN GROUP | Age: 70
End: 2020-09-24
Payer: MEDICARE

## 2020-09-24 VITALS
SYSTOLIC BLOOD PRESSURE: 110 MMHG | DIASTOLIC BLOOD PRESSURE: 70 MMHG | WEIGHT: 149.6 LBS | OXYGEN SATURATION: 96 % | RESPIRATION RATE: 20 BRPM | TEMPERATURE: 99 F | HEIGHT: 64 IN | BODY MASS INDEX: 25.54 KG/M2 | HEART RATE: 70 BPM

## 2020-09-24 DIAGNOSIS — Z23 NEED FOR VACCINATION: ICD-10-CM

## 2020-09-24 DIAGNOSIS — F41.9 ANXIETY AND DEPRESSION: ICD-10-CM

## 2020-09-24 DIAGNOSIS — F32.A ANXIETY AND DEPRESSION: ICD-10-CM

## 2020-09-24 DIAGNOSIS — E78.2 MIXED HYPERLIPIDEMIA: ICD-10-CM

## 2020-09-24 DIAGNOSIS — E55.9 VITAMIN D DEFICIENCY: ICD-10-CM

## 2020-09-24 PROCEDURE — 99214 OFFICE O/P EST MOD 30 MIN: CPT | Performed by: NURSE PRACTITIONER

## 2020-09-24 RX ORDER — ERGOCALCIFEROL 1.25 MG/1
50000 CAPSULE ORAL
Qty: 12 CAP | Refills: 0 | Status: SHIPPED | OUTPATIENT
Start: 2020-09-24 | End: 2020-12-03

## 2020-09-24 RX ORDER — SIMVASTATIN 5 MG
5 TABLET ORAL EVERY EVENING
Qty: 30 TAB | Refills: 2 | Status: SHIPPED | OUTPATIENT
Start: 2020-09-24 | End: 2020-10-20

## 2020-09-24 RX ORDER — CITALOPRAM 40 MG/1
40 TABLET ORAL
Qty: 30 TAB | Refills: 6 | Status: SHIPPED | OUTPATIENT
Start: 2020-09-24 | End: 2020-11-24 | Stop reason: SDUPTHER

## 2020-09-24 ASSESSMENT — FIBROSIS 4 INDEX: FIB4 SCORE: 1.635259635065353796

## 2020-09-24 NOTE — ASSESSMENT & PLAN NOTE
Results for GURDEEP JONES (MRN [id number]) as of 9/24/2020 08:18   Ref. Range 9/15/2020 11:45   Cholesterol,Tot Latest Ref Range: 100 - 199 mg/dL 175   Triglycerides Latest Ref Range: 0 - 149 mg/dL 209 (H)   HDL Latest Ref Range: >=40 mg/dL 32 (A)   LDL Latest Ref Range: <100 mg/dL 101 (H)   The 10-year ASCVD risk score (Courtney OCHOA Jr., et al., 2013) is: 20.8%    Values used to calculate the score:      Age: 69 years      Sex: Male      Is Non- : No      Diabetic: No      Tobacco smoker: Yes      Systolic Blood Pressure: 110 mmHg      Is BP treated: Yes      HDL Cholesterol: 32 mg/dL      Total Cholesterol: 175 mg/dL   Continues to intermittently smoke, not interested in quitting.  BP is normal, not a diabetic.  No family history of strokes or heart attacks.  Reports poor diet and not very active.

## 2020-09-24 NOTE — PROGRESS NOTES
Chief Complaint   Patient presents with   • Follow-Up     Lab Review        HISTORY OF PRESENT ILLNESS: Patient is a 69 y.o. male, established patient who presents today to discuss medical problems as listed below:    Health Maintenance:  COMPLETED     Vitamin D deficiency  Vit d at 32.     Mixed hyperlipidemia  Results for GURDEEP JONES (MRN [id number]) as of 9/24/2020 08:18   Ref. Range 9/15/2020 11:45   Cholesterol,Tot Latest Ref Range: 100 - 199 mg/dL 175   Triglycerides Latest Ref Range: 0 - 149 mg/dL 209 (H)   HDL Latest Ref Range: >=40 mg/dL 32 (A)   LDL Latest Ref Range: <100 mg/dL 101 (H)   The 10-year ASCVD risk score (Courtney OCHOA Jr., et al., 2013) is: 20.8%    Values used to calculate the score:      Age: 69 years      Sex: Male      Is Non- : No      Diabetic: No      Tobacco smoker: Yes      Systolic Blood Pressure: 110 mmHg      Is BP treated: Yes      HDL Cholesterol: 32 mg/dL      Total Cholesterol: 175 mg/dL   Continues to intermittently smoke, not interested in quitting.  BP is normal, not a diabetic.  No family history of strokes or heart attacks.  Reports poor diet and not very active.    Anxiety and depression  Chronic problem.  Well-controlled on citalopram 40 mg daily.  He denies depression, anxiety, suicidal ideation.  Needs refills today, prefers 30 days at a time to save on cost      Patient Active Problem List    Diagnosis Date Noted   • Vitamin D deficiency 09/24/2020   • Mixed hyperlipidemia 09/24/2020   • Toothache 05/26/2020   • Chronic right shoulder pain 11/05/2019   • Anxiety and depression 09/17/2019   • Insomnia due to other mental disorder 09/17/2019   • Mild intermittent asthma without complication 07/09/2019   • RLS (restless legs syndrome) 07/09/2019   • Herpes simplex 07/09/2019   • Essential hypertension 01/03/2019        Allergies: Benadryl [diphenhydramine] and Hydroxyzine    Current Outpatient Medications   Medication Sig Dispense Refill   •  citalopram (CELEXA) 40 MG Tab Take 1 Tab by mouth every day. 30 Tab 6   • vitamin D, Ergocalciferol, (DRISDOL) 1.25 MG (26579 UT) Cap capsule Take 1 Cap by mouth every 7 days. 12 Cap 0   • simvastatin (ZOCOR) 5 MG Tab Take 1 Tab by mouth every evening. 30 Tab 2   • acyclovir (ZOVIRAX) 400 MG tablet Take 1 Tab by mouth 2 times daily with meals as needed. 180 Tab 0   • albuterol 108 (90 Base) MCG/ACT Aero Soln inhalation aerosol Inhale 2 Puffs by mouth every 6 hours as needed. 8.5 g 1   • propranolol CR (INDERAL LA) 80 MG CAPSULE SR 24 HR Take 1 Cap by mouth every day. 100 Cap 1   • methadone (DOLOPHINE) 5 MG Tab Take 5 mg by mouth every 12 hours. FOR 30 DAYS  0     No current facility-administered medications for this visit.        Social History     Tobacco Use   • Smoking status: Current Some Day Smoker     Packs/day: 0.50     Years: 12.00     Pack years: 6.00     Types: Cigarettes     Last attempt to quit: 1/3/2014     Years since quittin.7   • Smokeless tobacco: Never Used   Substance Use Topics   • Alcohol use: No   • Drug use: No     Social History     Social History Narrative   • Not on file       Family History   Problem Relation Age of Onset   • Cancer Mother         breast   • Diabetes Mother    • Other Sister         Crohn's   • Alcohol/Drug Brother    • Hypertension Sister    • Alcohol/Drug Brother    • Heart Disease Brother        Allergies, past medical history, past surgical history, family history, social history reviewed and updated.    Review of Systems:     - Constitutional: Negative for fever, chills, unexpected weight change, and fatigue/generalized weakness.     - Respiratory: Negative for cough, sputum production, chest congestion, dyspnea, wheezing, and crackles.      - Cardiovascular: Negative for chest pain, palpitations, orthopnea, and bilateral lower extremity edema.       - Psychiatric/Behavioral: Negative for depression, suicidal/homicidal ideation and memory loss.      All other  "systems reviewed and are negative    Exam:    /70 (BP Location: Left arm, Patient Position: Sitting, BP Cuff Size: Adult)   Pulse 70   Temp 37.2 °C (99 °F) (Temporal)   Resp 20   Ht 1.626 m (5' 4\")   Wt 67.9 kg (149 lb 9.6 oz)   SpO2 96%   BMI 25.68 kg/m²  Body mass index is 25.68 kg/m².    Physical Exam:  Constitutional: Well-developed and well-nourished. Not diaphoretic. No distress.   Cardiovascular: Regular rate and rhythm, S1 and S2 without murmur, rubs, or gallops.    Chest: Effort normal. Clear to auscultation throughout. No adventitious sounds.   Neurological: Alert and oriented x 3.   Psychiatric:  Behavior, mood, and affect are appropriate.  MA/nursing note and vitals reviewed.    LABS:9/15   results reviewed and discussed with the patient, questions answered.    Patient was seen for 25 minutes face to face of which > 50% of appointment time was spent on counseling and coordination of care regarding the above.     Assessment/Plan:  1. Need for vaccination    2. Vitamin D deficiency  Stable.  Patient prefers Rx and will remember taking it once a week.  After Rx completion, take OTC vitamin D3 2000 UT daily.  - vitamin D, Ergocalciferol, (DRISDOL) 1.25 MG (05323 UT) Cap capsule; Take 1 Cap by mouth every 7 days.  Dispense: 12 Cap; Refill: 0    3. Mixed hyperlipidemia  Uncontrolled, stable.  Discussed etiology and risk factors.  Discussed healthy nutrition recommend daily exercise.  Will initiate simvastatin.  Will review tolerance and liver function next visit.  - simvastatin (ZOCOR) 5 MG Tab; Take 1 Tab by mouth every evening.  Dispense: 30 Tab; Refill: 2  - Lipid Profile; Future  - Comp Metabolic Panel; Future    4. Anxiety and depression  Stable on current management.  Refills given.       Discussed with patient possible alternative diagnoses, pt is to take all medications as prescribed. If symptoms persist FU w/PCP, if symptoms worsen go to emergency room. If experiencing any side effects " from prescribed medications reports to the office immediately or go to emergency room.  Reviewed indication, dosage, usage and potential adverse effects of prescribed medications. Reviewed risks and benefits of treatment plan. Patient verbalizes understanding of all instruction and verbally agrees to plan.    Return if symptoms worsen or fail to improve.

## 2020-09-24 NOTE — ASSESSMENT & PLAN NOTE
Chronic problem.  Well-controlled on citalopram 40 mg daily.  He denies depression, anxiety, suicidal ideation.  Needs refills today, prefers 30 days at a time to save on cost

## 2020-10-07 ENCOUNTER — OFFICE VISIT (OUTPATIENT)
Dept: URGENT CARE | Facility: CLINIC | Age: 70
End: 2020-10-07
Payer: MEDICARE

## 2020-10-07 VITALS
OXYGEN SATURATION: 96 % | WEIGHT: 148 LBS | TEMPERATURE: 98.2 F | HEART RATE: 68 BPM | RESPIRATION RATE: 16 BRPM | HEIGHT: 64 IN | BODY MASS INDEX: 25.27 KG/M2 | DIASTOLIC BLOOD PRESSURE: 68 MMHG | SYSTOLIC BLOOD PRESSURE: 130 MMHG

## 2020-10-07 DIAGNOSIS — R22.0 LEFT FACIAL SWELLING: ICD-10-CM

## 2020-10-07 DIAGNOSIS — K04.7 DENTAL INFECTION: ICD-10-CM

## 2020-10-07 DIAGNOSIS — K08.89 PAIN, DENTAL: ICD-10-CM

## 2020-10-07 PROCEDURE — 99214 OFFICE O/P EST MOD 30 MIN: CPT | Performed by: NURSE PRACTITIONER

## 2020-10-07 RX ORDER — AMOXICILLIN AND CLAVULANATE POTASSIUM 875; 125 MG/1; MG/1
1 TABLET, FILM COATED ORAL 2 TIMES DAILY
Qty: 14 TAB | Refills: 0 | Status: SHIPPED | OUTPATIENT
Start: 2020-10-07 | End: 2020-10-14

## 2020-10-07 ASSESSMENT — VISUAL ACUITY: OU: 1

## 2020-10-07 ASSESSMENT — ENCOUNTER SYMPTOMS
CONSTITUTIONAL NEGATIVE: 1
FEVER: 0

## 2020-10-07 ASSESSMENT — FIBROSIS 4 INDEX: FIB4 SCORE: 1.635259635065353796

## 2020-10-07 NOTE — PROGRESS NOTES
Subjective:     Raffaele Burton is a 69 y.o. male who presents for Dental Pain (had tooth filling 2 weeks ago and now it is sore and bleeding 3 arias ago, possible toothe infection)       Dental Problems   This is a new problem. Episode onset: 3 days ago. The problem has been rapidly worsening. The pain is severe. Associated symptoms include facial pain. Pertinent negatives include no fever.     Patient reporting left facial swelling, tenderness, and left upper dental pain. Has pain and swelling around his left upper molars after having a couple of fillings 2 weeks ago. Has been icing and using ibuprofen for the pain.    Patient was screened prior to rooming and denied COVID-19 diagnosis or contact with a person who has been diagnosed or is suspected to have COVID-19. During this visit, appropriate PPE was worn, hand hygiene was performed, and the patient and any visitors were masked.     PMH:  has a past medical history of Anxiety, Asthma, Depression, Herpes simplex, Hypertension, Restless leg syndrome, and Shoulder pain, right.    MEDS:   Current Outpatient Medications:   •  amoxicillin-clavulanate (AUGMENTIN) 875-125 MG Tab, Take 1 Tab by mouth 2 times a day for 7 days., Disp: 14 Tab, Rfl: 0  •  citalopram (CELEXA) 40 MG Tab, Take 1 Tab by mouth every day., Disp: 30 Tab, Rfl: 6  •  vitamin D, Ergocalciferol, (DRISDOL) 1.25 MG (12297 UT) Cap capsule, Take 1 Cap by mouth every 7 days., Disp: 12 Cap, Rfl: 0  •  simvastatin (ZOCOR) 5 MG Tab, Take 1 Tab by mouth every evening., Disp: 30 Tab, Rfl: 2  •  acyclovir (ZOVIRAX) 400 MG tablet, Take 1 Tab by mouth 2 times daily with meals as needed., Disp: 180 Tab, Rfl: 0  •  albuterol 108 (90 Base) MCG/ACT Aero Soln inhalation aerosol, Inhale 2 Puffs by mouth every 6 hours as needed., Disp: 8.5 g, Rfl: 1  •  propranolol CR (INDERAL LA) 80 MG CAPSULE SR 24 HR, Take 1 Cap by mouth every day., Disp: 100 Cap, Rfl: 1  •  methadone (DOLOPHINE) 5 MG Tab, Take 5 mg by mouth every 12  "hours. FOR 30 DAYS, Disp: , Rfl: 0    ALLERGIES:   Allergies   Allergen Reactions   • Benadryl [Diphenhydramine]    • Hydroxyzine      psychosis     SURGHX:   Past Surgical History:   Procedure Laterality Date   • CHOLECYSTECTOMY  2019   • ACL RECONSTRUCTION Left    • ARTHROPLASTY, SHOULDER Right    • HERNIA REPAIR Left    • TONSILLECTOMY AND ADENOIDECTOMY     • VASECTOMY       SOCHX:  reports that he has been smoking cigarettes. He has a 6.00 pack-year smoking history. He has never used smokeless tobacco. He reports that he does not drink alcohol or use drugs.     FH: Reviewed with patient, not pertinent to this visit.    Review of Systems   Constitutional: Negative.  Negative for fever.   HENT: Positive for dental problem.         Left upper dental pain, facial swelling, tenderness   All other systems reviewed and are negative.    Additional details per HPI.      Objective:     /68   Pulse 68   Temp 36.8 °C (98.2 °F)   Resp 16   Ht 1.626 m (5' 4\")   Wt 67.1 kg (148 lb)   SpO2 96%   BMI 25.40 kg/m²     Physical Exam  Vitals signs reviewed.   Constitutional:       General: He is not in acute distress.     Appearance: He is well-developed. He is not ill-appearing or toxic-appearing.   HENT:      Head: Normocephalic.        Right Ear: External ear normal.      Left Ear: External ear normal.      Nose: Nose normal.      Mouth/Throat:      Mouth: Mucous membranes are moist.      Dentition: Abnormal dentition. Dental tenderness, gingival swelling and dental caries present.      Pharynx: Oropharynx is clear. Uvula midline.     Eyes:      General: Vision grossly intact.      Extraocular Movements: Extraocular movements intact.      Conjunctiva/sclera: Conjunctivae normal.   Neck:      Musculoskeletal: Normal range of motion.   Cardiovascular:      Rate and Rhythm: Normal rate.   Pulmonary:      Effort: Pulmonary effort is normal. No respiratory distress.   Musculoskeletal: Normal range of motion.         " General: No deformity.   Lymphadenopathy:      Cervical: No cervical adenopathy.   Skin:     General: Skin is warm and dry.      Coloration: Skin is not pale.   Neurological:      Mental Status: He is alert and oriented to person, place, and time.      Sensory: No sensory deficit.      Motor: No weakness.      Coordination: Coordination normal.   Psychiatric:         Behavior: Behavior normal. Behavior is cooperative.       Assessment/Plan:     1. Dental infection  - amoxicillin-clavulanate (AUGMENTIN) 875-125 MG Tab; Take 1 Tab by mouth 2 times a day for 7 days.  Dispense: 14 Tab; Refill: 0    2. Pain, dental    3. Left facial swelling    Rx as above sent electronically. Ibuprofen, acetaminophen, ice. Follow up with dentistry.    Differential diagnosis, natural history, supportive care, over-the-counter symptom management per 's instructions, close monitoring, and indications for immediate follow-up discussed.     Patient advised to: Return for 1) Symptoms that worsen/don't improve, or go to ER, 2) Follow up with primary care in 7-10 days.    All questions answered. Patient agrees with the plan of care.

## 2020-10-13 ENCOUNTER — OFFICE VISIT (OUTPATIENT)
Dept: MEDICAL GROUP | Facility: PHYSICIAN GROUP | Age: 70
End: 2020-10-13
Payer: MEDICARE

## 2020-10-13 VITALS
SYSTOLIC BLOOD PRESSURE: 118 MMHG | BODY MASS INDEX: 24.72 KG/M2 | HEART RATE: 74 BPM | RESPIRATION RATE: 20 BRPM | TEMPERATURE: 98.9 F | HEIGHT: 64 IN | DIASTOLIC BLOOD PRESSURE: 62 MMHG | OXYGEN SATURATION: 94 % | WEIGHT: 144.8 LBS

## 2020-10-13 DIAGNOSIS — K08.89 TOOTHACHE: ICD-10-CM

## 2020-10-13 DIAGNOSIS — Z23 NEED FOR VACCINATION: ICD-10-CM

## 2020-10-13 DIAGNOSIS — K04.7 DENTAL INFECTION: ICD-10-CM

## 2020-10-13 PROCEDURE — 90662 IIV NO PRSV INCREASED AG IM: CPT | Performed by: NURSE PRACTITIONER

## 2020-10-13 PROCEDURE — 99213 OFFICE O/P EST LOW 20 MIN: CPT | Mod: 25 | Performed by: NURSE PRACTITIONER

## 2020-10-13 PROCEDURE — G0008 ADMIN INFLUENZA VIRUS VAC: HCPCS | Performed by: NURSE PRACTITIONER

## 2020-10-13 ASSESSMENT — FIBROSIS 4 INDEX: FIB4 SCORE: 1.635259635065353796

## 2020-10-13 NOTE — ASSESSMENT & PLAN NOTE
Pt is here to follow up on his recent dental infection on L. Upper jaw. He was evaluated at  on 10/7. Was treated w/ Augmentin. He finished abx Tx this am. Pain, swelling and tenderness subsided. He saw his DDS Dr. Alfredito Jones last week, xrays normal  / per pt. He denies fever, NVD. He has been utilizing mouth gargles with 50/50 water and hydrogen peroxide. Pt thinks him picking at his teeth and gum with a sharp dental floss induced gum bleeding and could have provoked this infection.

## 2020-10-13 NOTE — PROGRESS NOTES
Chief Complaint   Patient presents with   • Other     [facility] follow up        HISTORY OF PRESENT ILLNESS: Patient is a 69 y.o. male, established patient who presents today to discuss medical problems as listed below:    Health Maintenance:  COMPLETED     Dental infection  Pt is here to follow up on his recent dental infection on L. Upper jaw. He was evaluated at  on 10/7. Was treated w/ Augmentin. He finished abx Tx this am. Pain, swelling and tenderness subsided. He saw his DDS Dr. Alfredito Jones last week, xrays normal  / per pt. He denies fever, NVD. He has been utilizing mouth gargles with 50/50 water and hydrogen peroxide. Pt thinks him picking at his teeth and gum with a sharp dental floss induced gum bleeding and could have provoked this infection.      Patient Active Problem List    Diagnosis Date Noted   • Need for vaccination 10/13/2020   • Dental infection 10/13/2020   • Vitamin D deficiency 09/24/2020   • Mixed hyperlipidemia 09/24/2020   • Toothache 05/26/2020   • Chronic right shoulder pain 11/05/2019   • Anxiety and depression 09/17/2019   • Insomnia due to other mental disorder 09/17/2019   • Mild intermittent asthma without complication 07/09/2019   • RLS (restless legs syndrome) 07/09/2019   • Herpes simplex 07/09/2019   • Essential hypertension 01/03/2019        Allergies: Benadryl [diphenhydramine] and Hydroxyzine    Current Outpatient Medications   Medication Sig Dispense Refill   • citalopram (CELEXA) 40 MG Tab Take 1 Tab by mouth every day. 30 Tab 6   • vitamin D, Ergocalciferol, (DRISDOL) 1.25 MG (34044 UT) Cap capsule Take 1 Cap by mouth every 7 days. 12 Cap 0   • simvastatin (ZOCOR) 5 MG Tab Take 1 Tab by mouth every evening. 30 Tab 2   • acyclovir (ZOVIRAX) 400 MG tablet Take 1 Tab by mouth 2 times daily with meals as needed. 180 Tab 0   • albuterol 108 (90 Base) MCG/ACT Aero Soln inhalation aerosol Inhale 2 Puffs by mouth every 6 hours as needed. 8.5 g 1   • propranolol CR (INDERAL LA) 80 MG  "CAPSULE SR 24 HR Take 1 Cap by mouth every day. 100 Cap 1   • methadone (DOLOPHINE) 5 MG Tab Take 5 mg by mouth every 12 hours. FOR 30 DAYS  0   • amoxicillin-clavulanate (AUGMENTIN) 875-125 MG Tab Take 1 Tab by mouth 2 times a day for 7 days. (Patient not taking: Reported on 10/13/2020) 14 Tab 0     No current facility-administered medications for this visit.        Social History     Tobacco Use   • Smoking status: Current Some Day Smoker     Packs/day: 0.50     Years: 12.00     Pack years: 6.00     Types: Cigarettes     Last attempt to quit: 1/3/2014     Years since quittin.7   • Smokeless tobacco: Never Used   Substance Use Topics   • Alcohol use: No   • Drug use: No     Social History     Social History Narrative   • Not on file       Family History   Problem Relation Age of Onset   • Cancer Mother         breast   • Diabetes Mother    • Other Sister         Crohn's   • Alcohol/Drug Brother    • Hypertension Sister    • Alcohol/Drug Brother    • Heart Disease Brother        Allergies, past medical history, past surgical history, family history, social history reviewed and updated.    Review of Systems:     - Constitutional: Negative for fever, chills, unexpected weight change, and fatigue/generalized weakness.     - HEENT: Negative for headaches, vision changes, hearing changes, ear pain, ear discharge, rhinorrhea, sinus congestion, sore throat, and neck pain.      - Respiratory: Negative for cough, sputum production, chest congestion, dyspnea, wheezing, and crackles.      - Cardiovascular: Negative for chest pain, palpitations, orthopnea, and bilateral lower extremity edema.     - Psychiatric/Behavioral: Negative for depression, suicidal/homicidal ideation and memory loss.      All other systems reviewed and are negative    Exam:    /62 (BP Location: Left arm, Patient Position: Sitting, BP Cuff Size: Adult)   Pulse 74   Temp 37.2 °C (98.9 °F) (Temporal)   Resp 20   Ht 1.626 m (5' 4\")   Wt 65.7 " kg (144 lb 12.8 oz)   SpO2 94%   BMI 24.85 kg/m²  Body mass index is 24.85 kg/m².    Physical Exam:  Constitutional: Well-developed and well-nourished. Not diaphoretic. No distress.   Cardiovascular: Regular rate and rhythm, S1 and S2 without murmur, rubs, or gallops.    Chest: Effort normal. Clear to auscultation throughout. No adventitious sounds.   Neurological: Alert and oriented x 3.   Psychiatric:  Behavior, mood, and affect are appropriate.  MA/nursing note and vitals reviewed.    Patient was seen for 15 minutes face to face of which > 50% of appointment time was spent on counseling and coordination of care regarding the above.     Assessment/Plan:  1. Need for vaccination  - Influenza Vaccine, High Dose (65+ Only)    2. Dental infection  Stable.  Avoid poking the gums with sharp object.  Continue utilizing gentle mouth wash with 50-50 water and hydrogen peroxide to decrease bacterial load.  Also recommend OTC probiotics daily.  Also advised to take probiotic foods daily.    3. Toothache  Stable, as discussed in #2.       Discussed with patient possible alternative diagnoses, pt is to take all medications as prescribed. If symptoms persist FU w/PCP, if symptoms worsen go to emergency room. If experiencing any side effects from prescribed medications reports to the office immediately or go to emergency room.  Reviewed indication, dosage, usage and potential adverse effects of prescribed medications. Reviewed risks and benefits of treatment plan. Patient verbalizes understanding of all instruction and verbally agrees to plan.    No follow-ups on file.

## 2020-10-18 DIAGNOSIS — E78.2 MIXED HYPERLIPIDEMIA: ICD-10-CM

## 2020-10-20 RX ORDER — SIMVASTATIN 5 MG
TABLET ORAL
Qty: 30 TAB | Refills: 2 | Status: SHIPPED | OUTPATIENT
Start: 2020-10-20 | End: 2020-12-03

## 2020-11-24 ENCOUNTER — OFFICE VISIT (OUTPATIENT)
Dept: MEDICAL GROUP | Facility: PHYSICIAN GROUP | Age: 70
End: 2020-11-24
Payer: MEDICARE

## 2020-11-24 VITALS
SYSTOLIC BLOOD PRESSURE: 100 MMHG | HEART RATE: 60 BPM | OXYGEN SATURATION: 95 % | BODY MASS INDEX: 25.87 KG/M2 | DIASTOLIC BLOOD PRESSURE: 60 MMHG | HEIGHT: 63 IN | RESPIRATION RATE: 20 BRPM | WEIGHT: 146 LBS | TEMPERATURE: 99 F

## 2020-11-24 DIAGNOSIS — E78.2 MIXED HYPERLIPIDEMIA: ICD-10-CM

## 2020-11-24 DIAGNOSIS — F32.A ANXIETY AND DEPRESSION: ICD-10-CM

## 2020-11-24 DIAGNOSIS — F41.9 ANXIETY AND DEPRESSION: ICD-10-CM

## 2020-11-24 PROCEDURE — 99213 OFFICE O/P EST LOW 20 MIN: CPT | Performed by: NURSE PRACTITIONER

## 2020-11-24 RX ORDER — CITALOPRAM 40 MG/1
40 TABLET ORAL
Qty: 90 TAB | Refills: 1 | Status: SHIPPED | OUTPATIENT
Start: 2020-11-24 | End: 2021-05-25 | Stop reason: SDUPTHER

## 2020-11-24 ASSESSMENT — FIBROSIS 4 INDEX: FIB4 SCORE: 1.635259635065353796

## 2020-11-24 NOTE — ASSESSMENT & PLAN NOTE
Chronic problem. Lipids elevated, was started on Simvastatin  5 mg. Tolerating well. Has not completd his labs yet, planning on completing next week.

## 2020-11-24 NOTE — PROGRESS NOTES
Chief Complaint   Patient presents with   • Follow-Up       HISTORY OF PRESENT ILLNESS: Patient is a 69 y.o. male, established patient who presents today to discuss medical problems as listed below:    Health Maintenance:  COMPLETED     Mixed hyperlipidemia  Chronic problem. Lipids elevated, was started on Simvastatin  5 mg. Tolerating well. Has not completd his labs yet, planning on completing next week.    Anxiety and depression  Chronic problem, well controlled on Celexa 40 mg, needs refills today. Denies SI, HI      Patient Active Problem List    Diagnosis Date Noted   • Need for vaccination 10/13/2020   • Dental infection 10/13/2020   • Vitamin D deficiency 09/24/2020   • Mixed hyperlipidemia 09/24/2020   • Toothache 05/26/2020   • Chronic right shoulder pain 11/05/2019   • Anxiety and depression 09/17/2019   • Insomnia due to other mental disorder 09/17/2019   • Mild intermittent asthma without complication 07/09/2019   • RLS (restless legs syndrome) 07/09/2019   • Herpes simplex 07/09/2019   • Essential hypertension 01/03/2019        Allergies: Benadryl [diphenhydramine] and Hydroxyzine    Current Outpatient Medications   Medication Sig Dispense Refill   • citalopram (CELEXA) 40 MG Tab Take 1 Tab by mouth every day. 90 Tab 1   • simvastatin (ZOCOR) 5 MG Tab TAKE 1 TABLET BY MOUTH EVERY DAY IN THE EVENING 30 Tab 2   • vitamin D, Ergocalciferol, (DRISDOL) 1.25 MG (63120 UT) Cap capsule Take 1 Cap by mouth every 7 days. 12 Cap 0   • acyclovir (ZOVIRAX) 400 MG tablet Take 1 Tab by mouth 2 times daily with meals as needed. (Patient taking differently: Take 400 mg by mouth every day.) 180 Tab 0   • albuterol 108 (90 Base) MCG/ACT Aero Soln inhalation aerosol Inhale 2 Puffs by mouth every 6 hours as needed. 8.5 g 1   • propranolol CR (INDERAL LA) 80 MG CAPSULE SR 24 HR Take 1 Cap by mouth every day. 100 Cap 1   • methadone (DOLOPHINE) 5 MG Tab Take 5 mg by mouth every 12 hours. FOR 30 DAYS  0     No current  "facility-administered medications for this visit.        Social History     Tobacco Use   • Smoking status: Current Some Day Smoker     Packs/day: 0.50     Years: 12.00     Pack years: 6.00     Types: Cigarettes   • Smokeless tobacco: Never Used   Substance Use Topics   • Alcohol use: No   • Drug use: No     Social History     Social History Narrative   • Not on file       Family History   Problem Relation Age of Onset   • Cancer Mother         breast   • Diabetes Mother    • Other Sister         Crohn's   • Alcohol/Drug Brother    • Hypertension Sister    • Alcohol/Drug Brother    • Heart Disease Brother        Allergies, past medical history, past surgical history, family history, social history reviewed and updated.    Review of Systems:     - Constitutional: Negative for fever, chills, unexpected weight change, and fatigue/generalized weakness.       - Respiratory: Negative for cough, sputum production, chest congestion, dyspnea, wheezing, and crackles.      - Cardiovascular: Negative for chest pain, palpitations, orthopnea, and bilateral lower extremity edema.     - Psychiatric/Behavioral: Negative for depression, suicidal/homicidal ideation and memory loss.      All other systems reviewed and are negative    Exam:    /60 (BP Location: Left arm, Patient Position: Sitting, BP Cuff Size: Adult)   Pulse 60   Temp 37.2 °C (99 °F) (Temporal)   Resp 20   Ht 1.6 m (5' 3\")   Wt 66.2 kg (146 lb)   SpO2 95%   BMI 25.86 kg/m²  Body mass index is 25.86 kg/m².    Physical Exam:  Constitutional: Well-developed and well-nourished. Not diaphoretic. No distress.   Cardiovascular: Regular rate and rhythm, S1 and S2 without murmur, rubs, or gallops.    Chest: Effort normal. Clear to auscultation throughout. No adventitious sounds.   Neurological: Alert and oriented x 3.  Psychiatric:  Behavior, mood, and affect are appropriate.  MA/nursing note and vitals reviewed.      Assessment/Plan:  1. Mixed " hyperlipidemia  Stable. Will review lab results and discuss with pt.    2. Anxiety and depression  Stable on current regimen  - citalopram (CELEXA) 40 MG Tab; Take 1 Tab by mouth every day.  Dispense: 90 Tab; Refill: 1       Discussed with patient possible alternative diagnoses, pt is to take all medications as prescribed. If symptoms persist FU w/PCP, if symptoms worsen go to emergency room. If experiencing any side effects from prescribed medications reports to the office immediately or go to emergency room.  Reviewed indication, dosage, usage and potential adverse effects of prescribed medications. Reviewed risks and benefits of treatment plan. Patient verbalizes understanding of all instruction and verbally agrees to plan.    Return if symptoms worsen or fail to improve.

## 2020-12-03 DIAGNOSIS — E55.9 VITAMIN D DEFICIENCY: ICD-10-CM

## 2020-12-03 DIAGNOSIS — E78.2 MIXED HYPERLIPIDEMIA: ICD-10-CM

## 2020-12-03 RX ORDER — ERGOCALCIFEROL 1.25 MG/1
CAPSULE ORAL
Qty: 12 CAP | Refills: 0 | Status: SHIPPED | OUTPATIENT
Start: 2020-12-03 | End: 2021-02-02

## 2020-12-03 RX ORDER — SIMVASTATIN 5 MG
TABLET ORAL
Qty: 90 TAB | Refills: 0 | Status: SHIPPED | OUTPATIENT
Start: 2020-12-03 | End: 2021-02-02

## 2020-12-08 RX ORDER — PROPRANOLOL HYDROCHLORIDE 80 MG/1
CAPSULE, EXTENDED RELEASE ORAL
Qty: 90 CAP | Refills: 2 | Status: SHIPPED | OUTPATIENT
Start: 2020-12-08 | End: 2021-06-15

## 2020-12-18 ENCOUNTER — HOSPITAL ENCOUNTER (OUTPATIENT)
Dept: LAB | Facility: MEDICAL CENTER | Age: 70
End: 2020-12-18
Attending: NURSE PRACTITIONER
Payer: MEDICARE

## 2020-12-18 DIAGNOSIS — E78.2 MIXED HYPERLIPIDEMIA: ICD-10-CM

## 2020-12-18 LAB
ALBUMIN SERPL BCP-MCNC: 4.4 G/DL (ref 3.2–4.9)
ALBUMIN/GLOB SERPL: 1.6 G/DL
ALP SERPL-CCNC: 108 U/L (ref 30–99)
ALT SERPL-CCNC: 13 U/L (ref 2–50)
ANION GAP SERPL CALC-SCNC: 9 MMOL/L (ref 7–16)
AST SERPL-CCNC: 16 U/L (ref 12–45)
BILIRUB SERPL-MCNC: 0.4 MG/DL (ref 0.1–1.5)
BUN SERPL-MCNC: 10 MG/DL (ref 8–22)
CALCIUM SERPL-MCNC: 9.6 MG/DL (ref 8.5–10.5)
CHLORIDE SERPL-SCNC: 101 MMOL/L (ref 96–112)
CHOLEST SERPL-MCNC: 129 MG/DL (ref 100–199)
CO2 SERPL-SCNC: 27 MMOL/L (ref 20–33)
CREAT SERPL-MCNC: 0.86 MG/DL (ref 0.5–1.4)
FASTING STATUS PATIENT QL REPORTED: NORMAL
GLOBULIN SER CALC-MCNC: 2.7 G/DL (ref 1.9–3.5)
GLUCOSE SERPL-MCNC: 89 MG/DL (ref 65–99)
HDLC SERPL-MCNC: 33 MG/DL
LDLC SERPL CALC-MCNC: 70 MG/DL
POTASSIUM SERPL-SCNC: 4.7 MMOL/L (ref 3.6–5.5)
PROT SERPL-MCNC: 7.1 G/DL (ref 6–8.2)
SODIUM SERPL-SCNC: 137 MMOL/L (ref 135–145)
TRIGL SERPL-MCNC: 132 MG/DL (ref 0–149)

## 2020-12-18 PROCEDURE — 36415 COLL VENOUS BLD VENIPUNCTURE: CPT

## 2020-12-18 PROCEDURE — 80061 LIPID PANEL: CPT

## 2020-12-18 PROCEDURE — 80053 COMPREHEN METABOLIC PANEL: CPT

## 2020-12-29 ENCOUNTER — TELEPHONE (OUTPATIENT)
Dept: MEDICAL GROUP | Facility: PHYSICIAN GROUP | Age: 70
End: 2020-12-29

## 2020-12-29 NOTE — TELEPHONE ENCOUNTER
----- Message from ABDIEL Andrade sent at 12/24/2020  9:16 AM PST -----  Please let patient know I reviewed recent lab results and they look good.

## 2021-02-01 DIAGNOSIS — E78.2 MIXED HYPERLIPIDEMIA: ICD-10-CM

## 2021-02-01 DIAGNOSIS — E55.9 VITAMIN D DEFICIENCY: ICD-10-CM

## 2021-02-02 RX ORDER — SIMVASTATIN 5 MG
TABLET ORAL
Qty: 90 TAB | Refills: 0 | Status: SHIPPED | OUTPATIENT
Start: 2021-02-02 | End: 2021-05-05

## 2021-02-02 RX ORDER — ERGOCALCIFEROL 1.25 MG/1
CAPSULE ORAL
Qty: 12 CAP | Refills: 0 | Status: SHIPPED | OUTPATIENT
Start: 2021-02-02 | End: 2021-04-20

## 2021-04-20 DIAGNOSIS — E55.9 VITAMIN D DEFICIENCY: ICD-10-CM

## 2021-04-20 RX ORDER — ERGOCALCIFEROL 1.25 MG/1
CAPSULE ORAL
Qty: 12 CAPSULE | Refills: 0 | Status: SHIPPED | OUTPATIENT
Start: 2021-04-20 | End: 2021-07-20

## 2021-05-04 DIAGNOSIS — E78.2 MIXED HYPERLIPIDEMIA: ICD-10-CM

## 2021-05-05 RX ORDER — SIMVASTATIN 5 MG
TABLET ORAL
Qty: 90 TABLET | Refills: 0 | Status: SHIPPED | OUTPATIENT
Start: 2021-05-05 | End: 2021-07-06

## 2021-05-18 RX ORDER — METHADONE HYDROCHLORIDE 5 MG/1
5 TABLET ORAL
COMMUNITY
Start: 2021-05-05 | End: 2021-05-25

## 2021-05-25 ENCOUNTER — OFFICE VISIT (OUTPATIENT)
Dept: MEDICAL GROUP | Facility: PHYSICIAN GROUP | Age: 71
End: 2021-05-25
Payer: MEDICARE

## 2021-05-25 VITALS
DIASTOLIC BLOOD PRESSURE: 75 MMHG | TEMPERATURE: 97.9 F | OXYGEN SATURATION: 97 % | SYSTOLIC BLOOD PRESSURE: 138 MMHG | WEIGHT: 143.6 LBS | HEART RATE: 72 BPM | HEIGHT: 64 IN | BODY MASS INDEX: 24.52 KG/M2 | RESPIRATION RATE: 12 BRPM

## 2021-05-25 DIAGNOSIS — F32.A ANXIETY AND DEPRESSION: ICD-10-CM

## 2021-05-25 DIAGNOSIS — E78.2 MIXED HYPERLIPIDEMIA: ICD-10-CM

## 2021-05-25 DIAGNOSIS — F41.9 ANXIETY AND DEPRESSION: ICD-10-CM

## 2021-05-25 PROCEDURE — 99214 OFFICE O/P EST MOD 30 MIN: CPT | Performed by: NURSE PRACTITIONER

## 2021-05-25 RX ORDER — CITALOPRAM 40 MG/1
40 TABLET ORAL
Qty: 90 TABLET | Refills: 3 | Status: SHIPPED | OUTPATIENT
Start: 2021-05-25 | End: 2021-12-21 | Stop reason: SDUPTHER

## 2021-05-25 ASSESSMENT — FIBROSIS 4 INDEX: FIB4 SCORE: 1.5

## 2021-05-25 NOTE — PROGRESS NOTES
Chief Complaint   Patient presents with   • Medication Refill     Celexa       HISTORY OF PRESENT ILLNESS: Patient is a 70 y.o. male, established patient who presents today to discuss medical problems as listed below:    Health Maintenance:  COMPLETED     Anxiety and depression  Chronic problem.  This is well controlled on Celexa 40 mg.  Needs refills today.  He denies SI, HI.    Mixed hyperlipidemia  Results for GURDEEP JONES (MRN [id number]) as of 5/25/2021 11:05   Ref. Range 12/18/2020 14:01   Cholesterol,Tot Latest Ref Range: 100 - 199 mg/dL 129   Triglycerides Latest Ref Range: 0 - 149 mg/dL 132   HDL Latest Ref Range: >=40 mg/dL 33 (A)   LDL Latest Ref Range: <100 mg/dL 70   Chronic.  On simvastatin 5 mg, tolerating well.  Denies CP, dyspnea, dizziness.      Patient Active Problem List    Diagnosis Date Noted   • Need for vaccination 10/13/2020   • Dental infection 10/13/2020   • Vitamin D deficiency 09/24/2020   • Mixed hyperlipidemia 09/24/2020   • Toothache 05/26/2020   • Chronic right shoulder pain 11/05/2019   • Anxiety and depression 09/17/2019   • Insomnia due to other mental disorder 09/17/2019   • Mild intermittent asthma without complication 07/09/2019   • RLS (restless legs syndrome) 07/09/2019   • Herpes simplex 07/09/2019   • Essential hypertension 01/03/2019        Allergies: Benadryl [diphenhydramine] and Hydroxyzine    Current Outpatient Medications   Medication Sig Dispense Refill   • citalopram (CELEXA) 40 MG Tab Take 1 tablet by mouth every day for 90 days. 90 tablet 3   • simvastatin (ZOCOR) 5 MG Tab TAKE 1 TABLET BY MOUTH EVERY DAY IN THE EVENING 90 tablet 0   • vitamin D, Ergocalciferol, (DRISDOL) 1.25 MG (34226 UT) Cap capsule TAKE 1 CAPSULE BY MOUTH EVERY 7 DAYS 12 capsule 0   • propranolol CR (INDERAL LA) 80 MG CAPSULE SR 24 HR TAKE 1 CAPSULE BY MOUTH EVERY DAY 90 Cap 2   • acyclovir (ZOVIRAX) 400 MG tablet Take 1 Tab by mouth 2 times daily with meals as needed. (Patient taking  "differently: Take 400 mg by mouth every day.) 180 Tab 0   • albuterol 108 (90 Base) MCG/ACT Aero Soln inhalation aerosol Inhale 2 Puffs by mouth every 6 hours as needed. 8.5 g 1   • methadone (DOLOPHINE) 5 MG Tab Take 5 mg by mouth every 12 hours. FOR 30 DAYS  0     No current facility-administered medications for this visit.       Social History     Tobacco Use   • Smoking status: Current Some Day Smoker     Packs/day: 0.50     Years: 12.00     Pack years: 6.00     Types: Cigarettes   • Smokeless tobacco: Never Used   Vaping Use   • Vaping Use: Never used   Substance Use Topics   • Alcohol use: No   • Drug use: Yes     Types: Marijuana     Comment: occasional     Social History     Social History Narrative   • Not on file       Family History   Problem Relation Age of Onset   • Cancer Mother         breast   • Diabetes Mother    • Other Sister         Crohn's   • Alcohol/Drug Brother    • Hypertension Sister    • Alcohol/Drug Brother    • Heart Disease Brother        Allergies, past medical history, past surgical history, family history, social history reviewed and updated.    Review of Systems:     - Constitutional: Negative for fever, chills, unexpected weight change, and fatigue/generalized weakness.     - Respiratory: Negative for cough, sputum production, chest congestion, dyspnea, wheezing, and crackles.      - Cardiovascular: Negative for chest pain, palpitations, orthopnea, and bilateral lower extremity edema.     - Psychiatric/Behavioral: Negative for depression, suicidal/homicidal ideation and memory loss.      All other systems reviewed and are negative    Exam:    /75   Pulse 72   Temp 36.6 °C (97.9 °F) (Temporal)   Resp 12   Ht 1.626 m (5' 4\")   Wt 65.1 kg (143 lb 9.6 oz)   SpO2 97%   BMI 24.65 kg/m²  Body mass index is 24.65 kg/m².    Physical Exam:  Constitutional: Well-developed and well-nourished. Not diaphoretic. No distress.   Cardiovascular: Regular rate and rhythm, S1 and S2 " without murmur, rubs, or gallops.    Chest: Effort normal. Clear to auscultation throughout. No adventitious sounds. Neurological: Alert and oriented x 3.   Psychiatric:  Behavior, mood, and affect are appropriate.  MA/nursing note and vitals reviewed.    LABS:   results reviewed and discussed with the patient, questions answered.    Assessment/Plan:  1. Anxiety and depression  Stable on current regimen. Continue. Refills given.  - citalopram (CELEXA) 40 MG Tab; Take 1 tablet by mouth every day for 90 days.  Dispense: 90 tablet; Refill: 3    2. Mixed hyperlipidemia  Stable on current regimen. Continue  - Comp Metabolic Panel; Future  - Lipid Profile; Future       Discussed with patient possible alternative diagnoses, pt is to take all medications as prescribed. If symptoms persist FU w/PCP, if symptoms worsen go to emergency room. If experiencing any side effects from prescribed medications reports to the office immediately or go to emergency room.  Reviewed indication, dosage, usage and potential adverse effects of prescribed medications. Reviewed risks and benefits of treatment plan. Patient verbalizes understanding of all instruction and verbally agrees to plan.    No follow-ups on file. annual

## 2021-05-25 NOTE — ASSESSMENT & PLAN NOTE
Results for GURDEEP JONES (MRN [id number]) as of 5/25/2021 11:05   Ref. Range 12/18/2020 14:01   Cholesterol,Tot Latest Ref Range: 100 - 199 mg/dL 129   Triglycerides Latest Ref Range: 0 - 149 mg/dL 132   HDL Latest Ref Range: >=40 mg/dL 33 (A)   LDL Latest Ref Range: <100 mg/dL 70   Chronic.  On simvastatin 5 mg, tolerating well.  Denies CP, dyspnea, dizziness.

## 2021-05-25 NOTE — ASSESSMENT & PLAN NOTE
Chronic problem.  This is well controlled on Celexa 40 mg.  Needs refills today.  He denies SI, HI.

## 2021-06-15 RX ORDER — PROPRANOLOL HYDROCHLORIDE 80 MG/1
CAPSULE, EXTENDED RELEASE ORAL
Qty: 90 CAPSULE | Refills: 2 | Status: SHIPPED | OUTPATIENT
Start: 2021-06-15 | End: 2022-03-23

## 2021-07-04 DIAGNOSIS — E78.2 MIXED HYPERLIPIDEMIA: ICD-10-CM

## 2021-07-06 RX ORDER — SIMVASTATIN 5 MG
TABLET ORAL
Qty: 90 TABLET | Refills: 0 | Status: SHIPPED | OUTPATIENT
Start: 2021-07-06 | End: 2021-10-14

## 2021-07-19 DIAGNOSIS — E55.9 VITAMIN D DEFICIENCY: ICD-10-CM

## 2021-07-20 RX ORDER — ERGOCALCIFEROL 1.25 MG/1
CAPSULE ORAL
Qty: 12 CAPSULE | Refills: 0 | Status: SHIPPED | OUTPATIENT
Start: 2021-07-20 | End: 2021-10-07

## 2021-10-07 DIAGNOSIS — E55.9 VITAMIN D DEFICIENCY: ICD-10-CM

## 2021-10-07 RX ORDER — ERGOCALCIFEROL 1.25 MG/1
CAPSULE ORAL
Qty: 12 CAPSULE | Refills: 0 | Status: SHIPPED | OUTPATIENT
Start: 2021-10-07 | End: 2022-01-03

## 2021-10-14 DIAGNOSIS — E78.2 MIXED HYPERLIPIDEMIA: ICD-10-CM

## 2021-10-14 RX ORDER — SIMVASTATIN 5 MG
TABLET ORAL
Qty: 90 TABLET | Refills: 0 | Status: SHIPPED | OUTPATIENT
Start: 2021-10-14 | End: 2022-01-06

## 2021-12-21 DIAGNOSIS — F32.A ANXIETY AND DEPRESSION: ICD-10-CM

## 2021-12-21 DIAGNOSIS — F41.9 ANXIETY AND DEPRESSION: ICD-10-CM

## 2021-12-21 RX ORDER — CITALOPRAM 40 MG/1
40 TABLET ORAL
Qty: 90 TABLET | Refills: 0 | Status: SHIPPED | OUTPATIENT
Start: 2021-12-21 | End: 2022-01-28

## 2021-12-21 NOTE — TELEPHONE ENCOUNTER
Pt is going out of town for the holiday and he needs a refill of this medication sent to the pharmacy.

## 2022-01-03 DIAGNOSIS — E55.9 VITAMIN D DEFICIENCY: ICD-10-CM

## 2022-01-04 RX ORDER — ERGOCALCIFEROL 1.25 MG/1
CAPSULE ORAL
Qty: 12 CAPSULE | Refills: 0 | Status: SHIPPED | OUTPATIENT
Start: 2022-01-04

## 2022-01-06 ENCOUNTER — APPOINTMENT (OUTPATIENT)
Dept: MEDICAL GROUP | Facility: PHYSICIAN GROUP | Age: 72
End: 2022-01-06
Payer: MEDICARE

## 2022-01-06 DIAGNOSIS — E78.2 MIXED HYPERLIPIDEMIA: ICD-10-CM

## 2022-01-06 RX ORDER — SIMVASTATIN 5 MG
TABLET ORAL
Qty: 90 TABLET | Refills: 0 | Status: SHIPPED | OUTPATIENT
Start: 2022-01-06 | End: 2023-01-10 | Stop reason: SDUPTHER

## 2022-01-27 DIAGNOSIS — F41.9 ANXIETY AND DEPRESSION: ICD-10-CM

## 2022-01-27 DIAGNOSIS — F32.A ANXIETY AND DEPRESSION: ICD-10-CM

## 2022-01-28 RX ORDER — CITALOPRAM 40 MG/1
TABLET ORAL
Qty: 90 TABLET | Refills: 0 | Status: SHIPPED | OUTPATIENT
Start: 2022-01-28 | End: 2022-04-28

## 2022-03-23 DIAGNOSIS — I10 ESSENTIAL HYPERTENSION: ICD-10-CM

## 2022-03-23 RX ORDER — PROPRANOLOL HYDROCHLORIDE 80 MG/1
CAPSULE, EXTENDED RELEASE ORAL
Qty: 90 CAPSULE | Refills: 0 | Status: SHIPPED | OUTPATIENT
Start: 2022-03-23 | End: 2022-06-30

## 2022-04-28 DIAGNOSIS — F32.A ANXIETY AND DEPRESSION: ICD-10-CM

## 2022-04-28 DIAGNOSIS — F41.9 ANXIETY AND DEPRESSION: ICD-10-CM

## 2022-04-28 RX ORDER — CITALOPRAM 40 MG/1
40 TABLET ORAL
Qty: 14 TABLET | Refills: 0 | Status: SHIPPED | OUTPATIENT
Start: 2022-04-28 | End: 2022-04-29 | Stop reason: SDUPTHER

## 2022-04-28 NOTE — TELEPHONE ENCOUNTER
Received request via: Pharmacy    Was the patient seen in the last year in this department? Yes    Does the patient have an active prescription (recently filled or refills available) for medication(s) requested? No     Pt needs to be seen for further refills

## 2022-04-28 NOTE — TELEPHONE ENCOUNTER
"Called and spoke to Elena, Pharmacist at [facility] [phone]. Informed only 14 tablets due to patient needs an appt.    Patient informed by voicemail on 01/05/22 at 11:38am that he needs an appt for future refills. I also left patient a message on his voicemail today \"rx was approved for ONLY 14 tablets\" to please call for an appointment for additional refills. MD  "

## 2022-04-29 ENCOUNTER — OFFICE VISIT (OUTPATIENT)
Dept: MEDICAL GROUP | Facility: PHYSICIAN GROUP | Age: 72
End: 2022-04-29
Payer: MEDICARE

## 2022-04-29 VITALS
SYSTOLIC BLOOD PRESSURE: 122 MMHG | RESPIRATION RATE: 12 BRPM | HEIGHT: 64 IN | OXYGEN SATURATION: 91 % | HEART RATE: 57 BPM | DIASTOLIC BLOOD PRESSURE: 74 MMHG | WEIGHT: 158.8 LBS | TEMPERATURE: 97.2 F | BODY MASS INDEX: 27.11 KG/M2

## 2022-04-29 DIAGNOSIS — F32.A ANXIETY AND DEPRESSION: ICD-10-CM

## 2022-04-29 DIAGNOSIS — F41.9 ANXIETY AND DEPRESSION: ICD-10-CM

## 2022-04-29 DIAGNOSIS — E78.2 MIXED HYPERLIPIDEMIA: ICD-10-CM

## 2022-04-29 DIAGNOSIS — I10 ESSENTIAL HYPERTENSION: ICD-10-CM

## 2022-04-29 DIAGNOSIS — Z12.11 SCREENING FOR COLORECTAL CANCER: ICD-10-CM

## 2022-04-29 DIAGNOSIS — Z76.0 MEDICATION REFILL: ICD-10-CM

## 2022-04-29 DIAGNOSIS — Z12.5 SCREENING FOR PROSTATE CANCER: ICD-10-CM

## 2022-04-29 DIAGNOSIS — Z12.12 SCREENING FOR COLORECTAL CANCER: ICD-10-CM

## 2022-04-29 PROBLEM — G89.4 CHRONIC PAIN DISORDER: Status: ACTIVE | Noted: 2021-01-04

## 2022-04-29 PROBLEM — G89.29 CHRONIC PAIN OF RIGHT UPPER EXTREMITY: Status: ACTIVE | Noted: 2019-11-05

## 2022-04-29 PROBLEM — M79.601 CHRONIC PAIN OF RIGHT UPPER EXTREMITY: Status: ACTIVE | Noted: 2019-11-05

## 2022-04-29 PROBLEM — M25.711 OSTEOPHYTE OF RIGHT SHOULDER: Status: ACTIVE | Noted: 2021-01-04

## 2022-04-29 PROCEDURE — 99214 OFFICE O/P EST MOD 30 MIN: CPT | Performed by: NURSE PRACTITIONER

## 2022-04-29 RX ORDER — HYDROCODONE BITARTRATE AND ACETAMINOPHEN 5; 325 MG/1; MG/1
1 TABLET ORAL 2 TIMES DAILY PRN
COMMUNITY
End: 2022-06-29

## 2022-04-29 RX ORDER — CITALOPRAM 40 MG/1
40 TABLET ORAL
Qty: 90 TABLET | Refills: 0 | Status: SHIPPED | OUTPATIENT
Start: 2022-04-29 | End: 2022-06-29 | Stop reason: SDUPTHER

## 2022-04-29 ASSESSMENT — PATIENT HEALTH QUESTIONNAIRE - PHQ9: CLINICAL INTERPRETATION OF PHQ2 SCORE: 0

## 2022-04-29 ASSESSMENT — FIBROSIS 4 INDEX: FIB4 SCORE: 1.52

## 2022-04-29 NOTE — ASSESSMENT & PLAN NOTE
Chronic problem.  Symptoms are well controlled with citalopram 40 mg.  Needs refills today.  No labs since 2020.

## 2022-04-30 NOTE — ASSESSMENT & PLAN NOTE
Chronic problem.  Well-controlled on propranolol 80 mg daily.  BP today is 122/74 with a pulse of 57.  Denies dizziness.

## 2022-04-30 NOTE — PROGRESS NOTES
Chief Complaint   Patient presents with   • Follow-Up     On citalopram        HISTORY OF PRESENT ILLNESS: Patient is a 71 y.o. male, established patient who presents today to discuss medical problems as listed below:    Health Maintenance:  COMPLETED    Anxiety and depression  Chronic problem.  Symptoms are well controlled with citalopram 40 mg.  Needs refills today.  No labs since 2020.    Essential hypertension  Chronic problem.  Well-controlled on propranolol 80 mg daily.  BP today is 122/74 with a pulse of 57.  Denies dizziness.    Mixed hyperlipidemia  Last cholesterol panel from 2020 WNL.  On simvastatin 5 mg.  We will repeat labs      Patient Active Problem List    Diagnosis Date Noted   • Osteophyte of right shoulder 01/04/2021   • Chronic pain disorder 01/04/2021   • Need for vaccination 10/13/2020   • Dental infection 10/13/2020   • Vitamin D deficiency 09/24/2020   • Mixed hyperlipidemia 09/24/2020   • Toothache 05/26/2020   • Chronic right shoulder pain 11/05/2019   • Chronic pain of right upper extremity 11/05/2019   • Anxiety and depression 09/17/2019   • Insomnia due to other mental disorder 09/17/2019   • Mild intermittent asthma without complication 07/09/2019   • RLS (restless legs syndrome) 07/09/2019   • Herpes simplex 07/09/2019   • Essential hypertension 01/03/2019        Allergies: Benadryl [diphenhydramine] and Hydroxyzine    Current Outpatient Medications   Medication Sig Dispense Refill   • HYDROcodone-acetaminophen (NORCO) 5-325 MG Tab per tablet Take 1 Tablet by mouth 2 times a day as needed.     • citalopram (CELEXA) 40 MG Tab Take 1 Tablet by mouth every day for 14 days. Patient needs to be seen for further refills 90 Tablet 0   • propranolol CR (INDERAL LA) 80 MG CAPSULE SR 24 HR TAKE 1 CAPSULE BY MOUTH EVERY DAY 90 Capsule 0   • simvastatin (ZOCOR) 5 MG Tab TAKE 1 TABLET BY MOUTH EVERY DAY IN THE EVENING 90 Tablet 0   • vitamin D2, Ergocalciferol, (DRISDOL) 1.25 MG (46028 UT) Cap  capsule TAKE 1 CAPSULE BY MOUTH ONE TIME PER WEEK 12 Capsule 0   • acyclovir (ZOVIRAX) 400 MG tablet Take 1 Tab by mouth 2 times daily with meals as needed. (Patient taking differently: Take 400 mg by mouth every day.) 180 Tab 0   • albuterol 108 (90 Base) MCG/ACT Aero Soln inhalation aerosol Inhale 2 Puffs by mouth every 6 hours as needed. 8.5 g 1   • methadone (DOLOPHINE) 5 MG Tab Take 5 mg by mouth every 12 hours. FOR 30 DAYS  0     No current facility-administered medications for this visit.       Social History     Tobacco Use   • Smoking status: Current Some Day Smoker     Packs/day: 0.50     Years: 12.00     Pack years: 6.00     Types: Cigarettes   • Smokeless tobacco: Never Used   Vaping Use   • Vaping Use: Never used   Substance Use Topics   • Alcohol use: No   • Drug use: Yes     Types: Marijuana     Comment: occasional     Social History     Social History Narrative   • Not on file       Family History   Problem Relation Age of Onset   • Cancer Mother         breast   • Diabetes Mother    • Other Sister         Crohn's   • Alcohol/Drug Brother    • Hypertension Sister    • Alcohol/Drug Brother    • Heart Disease Brother        Allergies, past medical history, past surgical history, family history, social history reviewed and updated.    Review of Systems:     - Constitutional: Negative for fever, chills, unexpected weight change, and fatigue/generalized weakness.     - HEENT: Negative for headaches, vision changes, hearing changes, ear pain, ear discharge, rhinorrhea, sinus congestion, sore throat, and neck pain.      - Respiratory: Negative for cough, sputum production, chest congestion, dyspnea, wheezing, and crackles.      - Cardiovascular: Negative for chest pain, palpitations, orthopnea, and bilateral lower extremity edema.     - Gastrointestinal: Negative for heartburn, nausea, vomiting, abdominal pain, hematochezia, melena, diarrhea, constipation, and greasy/foul-smelling stools.     -  "Genitourinary: Negative for dysuria, polyuria, hematuria, pyuria, urinary urgency, and urinary incontinence.    - Musculoskeletal: Negative for myalgias, back pain, and joint pain.     - Skin: Negative for rash, itching, cyanotic skin color change.     - Neurological: Negative for dizziness, tingling, tremors, focal sensory deficit, focal weakness and headaches.     - Endo/Heme/Allergies: Does not bruise/bleed easily.     - Psychiatric/Behavioral: Negative for depression, suicidal/homicidal ideation and memory loss.      All other systems reviewed and are negative    Exam:    /74   Pulse (!) 57   Temp 36.2 °C (97.2 °F) (Temporal)   Resp 12   Ht 1.626 m (5' 4\")   Wt 72 kg (158 lb 12.8 oz)   SpO2 91%   BMI 27.26 kg/m²  Body mass index is 27.26 kg/m².    Physical Exam:  Constitutional: Well-developed and well-nourished. Not diaphoretic. No distress.   Skin: Skin is warm and dry. No rash noted.  Head: Atraumatic without lesions.  Eyes: Conjunctivae and extraocular motions are normal. Pupils are equal, round, and reactive to light. No scleral icterus.   Ears:  External ears unremarkable. Tympanic membranes clear and intact.  Nose: Nares patent. Septum midline. Turbinates without erythema nor edema. No discharge.   Mouth/Throat: Dentition is normal. Tongue normal. Oropharynx is clear and moist. Posterior pharynx without erythema or exudates.  Neck: Supple, trachea midline. Normal range of motion. No thyromegaly present. No lymphadenopathy--cervical or supraclavicular.  Cardiovascular: Regular rate and rhythm, S1 and S2 without murmur, rubs, or gallops.    Chest: Effort normal. Clear to auscultation throughout. No adventitious sounds. No CVA tenderness.  Abdomen: Soft, non tender, and without distention. Active bowel sounds in all four quadrants. No rebound, guarding, masses or HSM.  : Negative for dysuria, polyuria, hematuria, pyuria, urinary urgency, and urinary incontinence.  Extremities: No cyanosis, " clubbing, erythema, nor edema. Distal pulses intact and symmetric.   Musculoskeletal: All major joints AROM full in all directions without pain.  Neurological: Alert and oriented x 3. DTRs 2+/3 and symmetric. No cranial nerve deficit. 5/5 myotomes. Sensation intact. Negative Rhomberg.  Psychiatric:  Behavior, mood, and affect are appropriate.  MA/nursing note and vitals reviewed.    LABS: 2020  results reviewed and discussed with the patient, questions answered.    Assessment/Plan:  1. Anxiety and depression  Stable on current regimen.  Continue.  Refills given  - citalopram (CELEXA) 40 MG Tab; Take 1 Tablet by mouth every day for 14 days. Patient needs to be seen for further refills  Dispense: 90 Tablet; Refill: 0    2. Screening for prostate cancer  - PROSTATE SPECIFIC AG SCREENING; Future    3. Essential hypertension  Stable on current regimen.  Continue.  Refills given  - CBC WITHOUT DIFFERENTIAL; Future  - Comp Metabolic Panel; Future    4. Mixed hyperlipidemia  - Lipid Profile; Future    5. Screening for colorectal cancer  - Referral to GI for Colonoscopy  - COLOGUARD (FIT DNA)    6. Medication refill       Discussed with patient possible alternative diagnoses, pt is to take all medications as prescribed. If symptoms persist FU w/PCP, if symptoms worsen go to emergency room. If experiencing any side effects from prescribed medications reports to the office immediately or go to emergency room.  Reviewed indication, dosage, usage and potential adverse effects of prescribed medications. Reviewed risks and benefits of treatment plan. Patient verbalizes understanding of all instruction and verbally agrees to plan.    No follow-ups on file. 1 mo

## 2022-06-27 DIAGNOSIS — I10 ESSENTIAL HYPERTENSION: ICD-10-CM

## 2022-06-29 ENCOUNTER — OFFICE VISIT (OUTPATIENT)
Dept: MEDICAL GROUP | Facility: PHYSICIAN GROUP | Age: 72
End: 2022-06-29
Payer: MEDICARE

## 2022-06-29 VITALS
SYSTOLIC BLOOD PRESSURE: 118 MMHG | OXYGEN SATURATION: 97 % | DIASTOLIC BLOOD PRESSURE: 68 MMHG | BODY MASS INDEX: 26.38 KG/M2 | TEMPERATURE: 97.7 F | HEIGHT: 64 IN | HEART RATE: 77 BPM | RESPIRATION RATE: 12 BRPM | WEIGHT: 154.54 LBS

## 2022-06-29 DIAGNOSIS — N50.89 MASS OF LEFT TESTICLE: ICD-10-CM

## 2022-06-29 DIAGNOSIS — F41.9 ANXIETY AND DEPRESSION: ICD-10-CM

## 2022-06-29 DIAGNOSIS — F32.A ANXIETY AND DEPRESSION: ICD-10-CM

## 2022-06-29 PROCEDURE — 99213 OFFICE O/P EST LOW 20 MIN: CPT | Performed by: STUDENT IN AN ORGANIZED HEALTH CARE EDUCATION/TRAINING PROGRAM

## 2022-06-29 RX ORDER — CITALOPRAM 40 MG/1
40 TABLET ORAL DAILY
Qty: 60 TABLET | Refills: 2 | Status: SHIPPED | OUTPATIENT
Start: 2022-06-29 | End: 2022-08-28

## 2022-06-29 ASSESSMENT — FIBROSIS 4 INDEX: FIB4 SCORE: 1.52

## 2022-06-29 NOTE — ASSESSMENT & PLAN NOTE
Patient having a small 1 cm solid rubber feeling nodule in the left testicle.  There is no tenderness, rashes, lesions, swelling or erythema.  He otherwise feels well without any dysuria nausea, vomiting.    Unclear etiology we will order an ultrasound and follow-up results.

## 2022-06-29 NOTE — PROGRESS NOTES
HISTORY OF PRESENT ILLNESS: Raffaele is a pleasant 71 y.o. male, established patient who presents today to discuss medical problems as listed below:    Problem   Mass of Left Testicle    Noticed a free floating nodule in L testicle. Not painful but wondering what it is. Vasectomy in 1997. Denies dysuria, fevers, pressure, swelling.      Anxiety and Depression    Takes citolopram 40mg daily. Reports it works well for his anxiety. Needs refill.           Current Outpatient Medications Ordered in Epic   Medication Sig Dispense Refill   • citalopram (CELEXA) 40 MG Tab Take 1 Tablet by mouth every day for 60 days. Patient needs to be seen for further refills 60 Tablet 2   • propranolol CR (INDERAL LA) 80 MG CAPSULE SR 24 HR TAKE 1 CAPSULE BY MOUTH EVERY DAY 90 Capsule 0   • simvastatin (ZOCOR) 5 MG Tab TAKE 1 TABLET BY MOUTH EVERY DAY IN THE EVENING 90 Tablet 0   • vitamin D2, Ergocalciferol, (DRISDOL) 1.25 MG (43078 UT) Cap capsule TAKE 1 CAPSULE BY MOUTH ONE TIME PER WEEK 12 Capsule 0   • albuterol 108 (90 Base) MCG/ACT Aero Soln inhalation aerosol Inhale 2 Puffs by mouth every 6 hours as needed. 8.5 g 1   • methadone (DOLOPHINE) 5 MG Tab Take 5 mg by mouth every 12 hours. FOR 30 DAYS  0     No current [facility]-ordered facility-administered medications on file.       Review of systems:  Per HPI    Past Medical History:   Diagnosis Date   • Anxiety    • Asthma    • Depression    • Herpes simplex    • Hypertension    • Restless leg syndrome    • Shoulder pain, right      Past Surgical History:   Procedure Laterality Date   • CHOLECYSTECTOMY  2019   • ARTHROPLASTY, SHOULDER Right    • HERNIA REPAIR Left    • RECONSTRUCTION, KNEE, ACL Left    • TONSILLECTOMY AND ADENOIDECTOMY     • VASECTOMY       Social History     Tobacco Use   • Smoking status: Current Some Day Smoker     Packs/day: 0.50     Years: 12.00     Pack years: 6.00     Types: Cigarettes   • Smokeless tobacco: Never Used   Vaping Use   • Vaping Use: Never used  "  Substance Use Topics   • Alcohol use: No   • Drug use: Yes     Types: Marijuana     Comment: occasional      Family History   Problem Relation Age of Onset   • Cancer Mother         breast   • Diabetes Mother    • Other Sister         Crohn's   • Alcohol/Drug Brother    • Hypertension Sister    • Alcohol/Drug Brother    • Heart Disease Brother      Current Outpatient Medications   Medication Sig Dispense Refill   • citalopram (CELEXA) 40 MG Tab Take 1 Tablet by mouth every day for 60 days. Patient needs to be seen for further refills 60 Tablet 2   • propranolol CR (INDERAL LA) 80 MG CAPSULE SR 24 HR TAKE 1 CAPSULE BY MOUTH EVERY DAY 90 Capsule 0   • simvastatin (ZOCOR) 5 MG Tab TAKE 1 TABLET BY MOUTH EVERY DAY IN THE EVENING 90 Tablet 0   • vitamin D2, Ergocalciferol, (DRISDOL) 1.25 MG (65373 UT) Cap capsule TAKE 1 CAPSULE BY MOUTH ONE TIME PER WEEK 12 Capsule 0   • albuterol 108 (90 Base) MCG/ACT Aero Soln inhalation aerosol Inhale 2 Puffs by mouth every 6 hours as needed. 8.5 g 1   • methadone (DOLOPHINE) 5 MG Tab Take 5 mg by mouth every 12 hours. FOR 30 DAYS  0     No current facility-administered medications for this visit.       Allergies:  Allergies   Allergen Reactions   • Benadryl [Diphenhydramine]    • Hydroxyzine      psychosis       Allergies, past medical history, past surgical history, family history, social history reviewed and updated.    Objective:    /68 (BP Location: Left arm, Patient Position: Sitting, BP Cuff Size: Adult)   Pulse 77   Temp 36.5 °C (97.7 °F) (Temporal)   Resp 12   Ht 1.626 m (5' 4\")   Wt 70.1 kg (154 lb 8.7 oz)   SpO2 97%   BMI 26.53 kg/m²    Body mass index is 26.53 kg/m².    Physical exam:  General: Normal appearance, no acute distress, not ill-appearing  HEENT: EOM intact, conjunctiva normal limits, negative right/left eye discharge.  Sclera anicteric  Cardiovascular: Normal rate and rhythm, no murmurs  Pulmonary: No respiratory distress, no wheezing, no rales, " breath sounds normal.  Abdomen: Bowel sounds normal, flat, soft.  Musculoskeletal: No edema bilaterally  Skin: Warm, dry, no lesions  Neurological: No focal deficits, normal gait  Psychiatric: Mood within normal limits  Genitourinary: Small 1 cm rubbery feeling solid mass in the left testicle no tenderness, erythema, swelling, rashes, lesions.    Assessment/Plan:    Problem List Items Addressed This Visit     Anxiety and depression     Stable and well-controlled.  We will refill Celexa 40 mg daily.           Relevant Medications    citalopram (CELEXA) 40 MG Tab    Mass of left testicle     Patient having a small 1 cm solid rubber feeling nodule in the left testicle.  There is no tenderness, rashes, lesions, swelling or erythema.  He otherwise feels well without any dysuria nausea, vomiting.    Unclear etiology we will order an ultrasound and follow-up results.           Relevant Orders    [id number]           Return in about 2 months (around 8/29/2022), or AWV.

## 2022-06-30 RX ORDER — PROPRANOLOL HYDROCHLORIDE 80 MG/1
CAPSULE, EXTENDED RELEASE ORAL
Qty: 90 CAPSULE | Refills: 0 | Status: SHIPPED | OUTPATIENT
Start: 2022-06-30 | End: 2022-10-03

## 2022-08-30 ENCOUNTER — OFFICE VISIT (OUTPATIENT)
Dept: MEDICAL GROUP | Facility: PHYSICIAN GROUP | Age: 72
End: 2022-08-30
Payer: MEDICARE

## 2022-08-30 VITALS
TEMPERATURE: 98.6 F | BODY MASS INDEX: 26.22 KG/M2 | RESPIRATION RATE: 12 BRPM | WEIGHT: 153.6 LBS | HEART RATE: 72 BPM | OXYGEN SATURATION: 95 % | HEIGHT: 64 IN

## 2022-08-30 DIAGNOSIS — G89.29 CHRONIC LEFT SHOULDER PAIN: ICD-10-CM

## 2022-08-30 DIAGNOSIS — M25.512 CHRONIC LEFT SHOULDER PAIN: ICD-10-CM

## 2022-08-30 DIAGNOSIS — F34.1 DYSTHYMIA: ICD-10-CM

## 2022-08-30 DIAGNOSIS — M19.012 GLENOHUMERAL ARTHRITIS, LEFT: ICD-10-CM

## 2022-08-30 PROCEDURE — 99214 OFFICE O/P EST MOD 30 MIN: CPT | Performed by: NURSE PRACTITIONER

## 2022-08-30 RX ORDER — CITALOPRAM 40 MG/1
40 TABLET ORAL DAILY
COMMUNITY
End: 2022-08-30

## 2022-08-30 RX ORDER — AMOXICILLIN 500 MG/1
CAPSULE ORAL
COMMUNITY
Start: 2022-06-13 | End: 2022-08-30

## 2022-08-30 RX ORDER — FLUOXETINE HYDROCHLORIDE 40 MG/1
40 CAPSULE ORAL DAILY
Qty: 30 CAPSULE | Refills: 1 | Status: SHIPPED | OUTPATIENT
Start: 2022-08-30 | End: 2022-09-23

## 2022-08-30 RX ORDER — ACYCLOVIR 400 MG/1
400 TABLET ORAL 2 TIMES DAILY
COMMUNITY
End: 2022-12-02 | Stop reason: SDUPTHER

## 2022-08-30 ASSESSMENT — PATIENT HEALTH QUESTIONNAIRE - PHQ9
SUM OF ALL RESPONSES TO PHQ QUESTIONS 1-9: 10
5. POOR APPETITE OR OVEREATING: 0 - NOT AT ALL
CLINICAL INTERPRETATION OF PHQ2 SCORE: 3

## 2022-08-30 ASSESSMENT — FIBROSIS 4 INDEX: FIB4 SCORE: 1.52

## 2022-08-30 NOTE — ASSESSMENT & PLAN NOTE
Chronic intermittent.  On Celexa 40 mg.  Not sure if it was helping.  Patient states depression is prevalent, more likely than anxiety.  He recently lost his cat.  He also has chronic left shoulder pain, no longer seeing pain medicine as he broke pain management controlled substance agreement and was kicked out from practice.

## 2022-08-30 NOTE — ASSESSMENT & PLAN NOTE
Chronic problem.  Left shoulder pain for years.  Patient was followed by pain specialist, [facility] in [place].  Patient was told he had advanced arthritis he needed surgery.  Unsure why patient was not referred to orthopedics.  X-ray from September 2021 results:  Severe glenohumeral joint degenerative change.     No acute fracture or subluxation of the shoulder.

## 2022-08-30 NOTE — PROGRESS NOTES
Chief Complaint   Patient presents with    Follow-Up     depression       HISTORY OF PRESENT ILLNESS: Patient is a 71 y.o. male, established patient who presents today to discuss medical problems as listed below:    Health Maintenance:  COMPLETED     Dysthymia  Chronic intermittent.  On Celexa 40 mg.  Not sure if it was helping.  Patient states depression is prevalent, more likely than anxiety.  He recently lost his cat.  He also has chronic left shoulder pain, no longer seeing pain medicine as he broke pain management controlled substance agreement and was kicked out from practice.    Chronic left shoulder pain  Chronic problem.  Left shoulder pain for years.  Patient was followed by pain specialist, [facility] pain [facility] in [place].  Patient was told he had advanced arthritis he needed surgery.  Unsure why patient was not referred to orthopedics.  X-ray from September 2021 results:  Severe glenohumeral joint degenerative change.     No acute fracture or subluxation of the shoulder.        Patient Active Problem List    Diagnosis Date Noted    Dysthymia 08/30/2022    Chronic left shoulder pain 08/30/2022    Mass of left testicle 06/29/2022    Osteophyte of right shoulder 01/04/2021    Chronic pain disorder 01/04/2021    Need for vaccination 10/13/2020    Dental infection 10/13/2020    Vitamin D deficiency 09/24/2020    Mixed hyperlipidemia 09/24/2020    Toothache 05/26/2020    Chronic right shoulder pain 11/05/2019    Chronic pain of right upper extremity 11/05/2019    Anxiety and depression 09/17/2019    Insomnia due to other mental disorder 09/17/2019    Mild intermittent asthma without complication 07/09/2019    RLS (restless legs syndrome) 07/09/2019    Herpes simplex 07/09/2019    Essential hypertension 01/03/2019        Allergies: Benadryl [diphenhydramine] and Hydroxyzine    Current Outpatient Medications   Medication Sig Dispense Refill    acyclovir (ZOVIRAX) 400 MG tablet Take 400 mg by mouth 2 times a  day.      fluoxetine (PROZAC) 40 MG capsule Take 1 Capsule by mouth every day. 30 Capsule 1    propranolol CR (INDERAL LA) 80 MG CAPSULE SR 24 HR TAKE 1 CAPSULE BY MOUTH EVERY DAY 90 Capsule 0    simvastatin (ZOCOR) 5 MG Tab TAKE 1 TABLET BY MOUTH EVERY DAY IN THE EVENING 90 Tablet 0    vitamin D2, Ergocalciferol, (DRISDOL) 1.25 MG (14030 UT) Cap capsule TAKE 1 CAPSULE BY MOUTH ONE TIME PER WEEK 12 Capsule 0    albuterol 108 (90 Base) MCG/ACT Aero Soln inhalation aerosol Inhale 2 Puffs by mouth every 6 hours as needed. 8.5 g 1    methadone (DOLOPHINE) 5 MG Tab Take 5 mg by mouth every 12 hours. FOR 30 DAYS  0     No current facility-administered medications for this visit.       Social History     Tobacco Use    Smoking status: Some Days     Packs/day: 0.50     Years: 12.00     Pack years: 6.00     Types: Cigarettes    Smokeless tobacco: Never   Vaping Use    Vaping Use: Never used   Substance Use Topics    Alcohol use: No    Drug use: Yes     Types: Marijuana     Comment: occasional     Social History     Social History Narrative    Not on file       Family History   Problem Relation Age of Onset    Cancer Mother         breast    Diabetes Mother     Other Sister         Crohn's    Alcohol/Drug Brother     Hypertension Sister     Alcohol/Drug Brother     Heart Disease Brother        Allergies, past medical history, past surgical history, family history, social history reviewed and updated.    Review of Systems:     - Constitutional: Negative for fever, chills, unexpected weight change, and fatigue/generalized weakness.     - Respiratory: Negative for cough, sputum production, chest congestion, dyspnea, wheezing, and crackles.      - Cardiovascular: Negative for chest pain, palpitations, orthopnea, and bilateral lower extremity edema.       - Musculoskeletal: Chronic left shoulder pain  - Psychiatric/Behavioral: Depression.  Negative for suicidal/homicidal ideation and memory loss.      All other systems reviewed  "and are negative    Exam:    BP (P) 122/70   Pulse 72   Temp 37 °C (98.6 °F) (Temporal)   Resp 12   Ht 1.626 m (5' 4\")   Wt 69.7 kg (153 lb 9.6 oz)   SpO2 95%   BMI 26.37 kg/m²  Body mass index is 26.37 kg/m².    Physical Exam:  Constitutional: Well-developed and well-nourished. Not diaphoretic. No distress.   Cardiovascular: Regular rate and rhythm, S1 and S2 without murmur, rubs, or gallops.    Chest: Effort normal. Clear to auscultation throughout. No adventitious sounds. Neurological: Alert and oriented x 3.   Psychiatric:  Behavior, mood, and affect are appropriate.  MA/nursing note and vitals reviewed.    Assessment/Plan:  1. Dysthymia  Trial of direct switch from Celexa to Prozac.  Do not take both medications together to avoid risk of serotonin.  Take Prozac in the morning.  We will follow-up in 2 to 3 weeks  - fluoxetine (PROZAC) 40 MG capsule; Take 1 Capsule by mouth every day.  Dispense: 30 Capsule; Refill: 1    2. Chronic left shoulder pain  - Referral to Orthopedics    3. Glenohumeral arthritis, left  - Referral to Orthopedics       Discussed with patient possible alternative diagnoses, pt is to take all medications as prescribed. If symptoms persist FU w/PCP, if symptoms worsen go to emergency room. If experiencing any side effects from prescribed medications reports to the office immediately or go to emergency room.  Reviewed indication, dosage, usage and potential adverse effects of prescribed medications. Reviewed risks and benefits of treatment plan. Patient verbalizes understanding of all instruction and verbally agrees to plan.    No follow-ups on file. 3 wks  "

## 2022-09-01 ENCOUNTER — TELEPHONE (OUTPATIENT)
Dept: MEDICAL GROUP | Facility: PHYSICIAN GROUP | Age: 72
End: 2022-09-01
Payer: MEDICARE

## 2022-09-01 NOTE — TELEPHONE ENCOUNTER
Contacted patient to let him know his prescription [fluoxetine (PROZAC) 40 MG capsule] did arrive at his pharmacy and he is able to go pick it up.

## 2022-09-23 ENCOUNTER — OFFICE VISIT (OUTPATIENT)
Dept: MEDICAL GROUP | Facility: PHYSICIAN GROUP | Age: 72
End: 2022-09-23
Payer: MEDICARE

## 2022-09-23 VITALS
SYSTOLIC BLOOD PRESSURE: 120 MMHG | HEART RATE: 74 BPM | OXYGEN SATURATION: 96 % | BODY MASS INDEX: 25.95 KG/M2 | DIASTOLIC BLOOD PRESSURE: 68 MMHG | TEMPERATURE: 98.6 F | RESPIRATION RATE: 12 BRPM | WEIGHT: 152 LBS | HEIGHT: 64 IN

## 2022-09-23 DIAGNOSIS — F41.9 ANXIETY AND DEPRESSION: ICD-10-CM

## 2022-09-23 DIAGNOSIS — F51.05 INSOMNIA DUE TO OTHER MENTAL DISORDER: ICD-10-CM

## 2022-09-23 DIAGNOSIS — F34.1 DYSTHYMIA: ICD-10-CM

## 2022-09-23 DIAGNOSIS — F99 INSOMNIA DUE TO OTHER MENTAL DISORDER: ICD-10-CM

## 2022-09-23 DIAGNOSIS — F32.A ANXIETY AND DEPRESSION: ICD-10-CM

## 2022-09-23 DIAGNOSIS — Z23 NEED FOR VACCINATION: ICD-10-CM

## 2022-09-23 PROCEDURE — 99214 OFFICE O/P EST MOD 30 MIN: CPT | Mod: 25 | Performed by: NURSE PRACTITIONER

## 2022-09-23 PROCEDURE — G0008 ADMIN INFLUENZA VIRUS VAC: HCPCS | Performed by: NURSE PRACTITIONER

## 2022-09-23 PROCEDURE — 90662 IIV NO PRSV INCREASED AG IM: CPT | Performed by: NURSE PRACTITIONER

## 2022-09-23 RX ORDER — QUETIAPINE FUMARATE 25 MG/1
25 TABLET, FILM COATED ORAL
Qty: 30 TABLET | Refills: 0 | Status: SHIPPED | OUTPATIENT
Start: 2022-09-23 | End: 2022-10-07 | Stop reason: SDUPTHER

## 2022-09-23 RX ORDER — FLUOXETINE HYDROCHLORIDE 40 MG/1
40 CAPSULE ORAL DAILY
Qty: 30 CAPSULE | Refills: 1 | Status: SHIPPED | OUTPATIENT
Start: 2022-09-23 | End: 2022-10-07

## 2022-09-23 NOTE — PROGRESS NOTES
Chief Complaint   Patient presents with    Follow-Up       HISTORY OF PRESENT ILLNESS: Patient is a 71 y.o. male, established patient who presents today to discuss medical problems as listed below:    Health Maintenance:  COMPLETED     Anxiety and depression  Chronic problem. On Prozac 40 mg daily. Still feeling depressed, no SI. Pt is in therapy at [facility] outpt, group therapy Lamine Dickerson LCSW.   He continues to smoke 4-6 cigs per day, habitual  / per pt. He is not sleeping well.  Patient has continue things happen this month his cat  and his friend passed away.    Insomnia due to other mental disorder  Chronic problem. In the past tried Trazodone did not help him.     Patient Active Problem List    Diagnosis Date Noted    Dysthymia 2022    Chronic left shoulder pain 2022    Mass of left testicle 2022    Osteophyte of right shoulder 2021    Chronic pain disorder 2021    Need for vaccination 10/13/2020    Dental infection 10/13/2020    Vitamin D deficiency 2020    Mixed hyperlipidemia 2020    Toothache 2020    Chronic right shoulder pain 2019    Chronic pain of right upper extremity 2019    Anxiety and depression 2019    Insomnia due to other mental disorder 2019    Mild intermittent asthma without complication 2019    RLS (restless legs syndrome) 2019    Herpes simplex 2019    Essential hypertension 2019        Allergies: Benadryl [diphenhydramine] and Hydroxyzine    Current Outpatient Medications   Medication Sig Dispense Refill    QUEtiapine (SEROQUEL) 25 MG Tab Take 1 Tablet by mouth at bedtime. 30 Tablet 0    acyclovir (ZOVIRAX) 400 MG tablet Take 400 mg by mouth 2 times a day.      fluoxetine (PROZAC) 40 MG capsule Take 1 Capsule by mouth every day. 30 Capsule 1    propranolol CR (INDERAL LA) 80 MG CAPSULE SR 24 HR TAKE 1 CAPSULE BY MOUTH EVERY DAY 90 Capsule 0    simvastatin (ZOCOR) 5 MG Tab TAKE 1 TABLET  "BY MOUTH EVERY DAY IN THE EVENING 90 Tablet 0    vitamin D2, Ergocalciferol, (DRISDOL) 1.25 MG (19690 UT) Cap capsule TAKE 1 CAPSULE BY MOUTH ONE TIME PER WEEK 12 Capsule 0    albuterol 108 (90 Base) MCG/ACT Aero Soln inhalation aerosol Inhale 2 Puffs by mouth every 6 hours as needed. 8.5 g 1     No current facility-administered medications for this visit.       Social History     Tobacco Use    Smoking status: Some Days     Packs/day: 0.50     Years: 12.00     Pack years: 6.00     Types: Cigarettes    Smokeless tobacco: Never   Vaping Use    Vaping Use: Never used   Substance Use Topics    Alcohol use: No    Drug use: Yes     Types: Marijuana     Comment: occasional     Social History     Social History Narrative    Not on file       Family History   Problem Relation Age of Onset    Cancer Mother         breast    Diabetes Mother     Other Sister         Crohn's    Alcohol/Drug Brother     Hypertension Sister     Alcohol/Drug Brother     Heart Disease Brother        Allergies, past medical history, past surgical history, family history, social history reviewed and updated.    Review of Systems:     - Constitutional: Negative for fever, chills, unexpected weight change, and fatigue/generalized weakness.     - Respiratory: Negative for cough, sputum production, chest congestion, dyspnea, wheezing, and crackles.      - Cardiovascular: Negative for chest pain, palpitations, orthopnea, and bilateral lower extremity edema.      - Psychiatric/Behavioral: depression and insomnia. Negative for suicidal/homicidal ideation and memory loss.      All other systems reviewed and are negative    Exam:    /68 (BP Location: Right arm, Patient Position: Sitting, BP Cuff Size: Adult)   Pulse 74   Temp 37 °C (98.6 °F) (Temporal)   Resp 12   Ht 1.626 m (5' 4\")   Wt 68.9 kg (152 lb)   SpO2 96%   BMI 26.09 kg/m²  Body mass index is 26.09 kg/m².    Physical Exam:  Constitutional: Well-developed and well-nourished. Not " diaphoretic. No distress.   Cardiovascular: Regular rate and rhythm, S1 and S2 without murmur, rubs, or gallops.    Chest: Effort normal. Clear to auscultation throughout. No adventitious sounds. Neurological: Alert and oriented x 3.   Psychiatric:  Behavior, mood, and affect are appropriate.  MA/nursing note and vitals reviewed.    Assessment/Plan:  1. Need for vaccination  - Influenza Vaccine, High Dose (65+ Only)    2. Anxiety and depression  Uncontrolled, stable.  Continue Prozac.  Will treat insomnia.  Patient to continue therapy.  Discussed importance of support systems including charge.  Patient will go to [facility] and will attend [employer] this Sunday.    3. Insomnia due to other mental disorder  Uncontrolled, stable.  Discussed hygiene.  Trial of Seroquel.  We will follow-up in 2 weeks.  - QUEtiapine (SEROQUEL) 25 MG Tab; Take 1 Tablet by mouth at bedtime.  Dispense: 30 Tablet; Refill: 0       Discussed with patient possible alternative diagnoses, pt is to take all medications as prescribed. If symptoms persist FU w/PCP, if symptoms worsen go to emergency room. If experiencing any side effects from prescribed medications reports to the office immediately or go to emergency room.  Reviewed indication, dosage, usage and potential adverse effects of prescribed medications. Reviewed risks and benefits of treatment plan. Patient verbalizes understanding of all instruction and verbally agrees to plan.    No follow-ups on file. 2 wks

## 2022-09-23 NOTE — ASSESSMENT & PLAN NOTE
Chronic problem. On Prozac 40 mg daily. Still feeling depressed, no SI. Pt is in therapy at [facility] outpt, group therapy Lamine Dickerson LCSW.   He continues to smoke 4-6 cigs per day, habitual  / per pt. He is not sleeping well.  Patient has continue things happen this month his cat  and his friend passed away.

## 2022-10-02 DIAGNOSIS — I10 ESSENTIAL HYPERTENSION: ICD-10-CM

## 2022-10-03 RX ORDER — PROPRANOLOL HYDROCHLORIDE 80 MG/1
CAPSULE, EXTENDED RELEASE ORAL
Qty: 90 CAPSULE | Refills: 0 | Status: SHIPPED | OUTPATIENT
Start: 2022-10-03 | End: 2023-01-10

## 2022-10-07 ENCOUNTER — OFFICE VISIT (OUTPATIENT)
Dept: MEDICAL GROUP | Facility: PHYSICIAN GROUP | Age: 72
End: 2022-10-07
Payer: MEDICARE

## 2022-10-07 VITALS
SYSTOLIC BLOOD PRESSURE: 118 MMHG | DIASTOLIC BLOOD PRESSURE: 70 MMHG | RESPIRATION RATE: 16 BRPM | HEART RATE: 76 BPM | HEIGHT: 64 IN | WEIGHT: 153.6 LBS | BODY MASS INDEX: 26.22 KG/M2 | TEMPERATURE: 97.8 F | OXYGEN SATURATION: 97 %

## 2022-10-07 DIAGNOSIS — F41.9 ANXIETY AND DEPRESSION: ICD-10-CM

## 2022-10-07 DIAGNOSIS — F99 INSOMNIA DUE TO OTHER MENTAL DISORDER: ICD-10-CM

## 2022-10-07 DIAGNOSIS — F32.A ANXIETY AND DEPRESSION: ICD-10-CM

## 2022-10-07 DIAGNOSIS — F51.05 INSOMNIA DUE TO OTHER MENTAL DISORDER: ICD-10-CM

## 2022-10-07 PROCEDURE — 99214 OFFICE O/P EST MOD 30 MIN: CPT | Performed by: NURSE PRACTITIONER

## 2022-10-07 RX ORDER — QUETIAPINE FUMARATE 25 MG/1
25 TABLET, FILM COATED ORAL
Qty: 30 TABLET | Refills: 3 | Status: SHIPPED | OUTPATIENT
Start: 2022-10-07 | End: 2023-01-10

## 2022-10-07 NOTE — ASSESSMENT & PLAN NOTE
Chronic and stable.  Previously on Prozac 40 mg, was not sure if it was making a difference.  Last visit he was placed on Seroquel 25 mg at bedtime.   His symptoms significantly improved, sleeping better, moods are better regulated, no highs or lows. He also started going to [employer] and [employer] study during the week which he enjoys. Would like to stay on current dose. Will follow up in 3 mos

## 2022-10-07 NOTE — PROGRESS NOTES
Chief Complaint   Patient presents with    Follow-Up     QUEtiapine, states medication is working well        HISTORY OF PRESENT ILLNESS: Patient is a 71 y.o. male, established patient who presents today to discuss medical problems as listed below:    Health Maintenance:  COMPLETED     Anxiety and depression  Chronic and stable.  Previously on Prozac 40 mg, was not sure if it was making a difference.  Last visit he was placed on Seroquel 25 mg at bedtime.   His symptoms significantly improved, sleeping better, moods are better regulated, no highs or lows. He also started going to [employer] and [employer] study during the week which he enjoys. Would like to stay on current dose. Will follow up in 3 mos      Patient Active Problem List    Diagnosis Date Noted    Dysthymia 08/30/2022    Chronic left shoulder pain 08/30/2022    Mass of left testicle 06/29/2022    Osteophyte of right shoulder 01/04/2021    Chronic pain disorder 01/04/2021    Need for vaccination 10/13/2020    Dental infection 10/13/2020    Vitamin D deficiency 09/24/2020    Mixed hyperlipidemia 09/24/2020    Toothache 05/26/2020    Chronic right shoulder pain 11/05/2019    Chronic pain of right upper extremity 11/05/2019    Anxiety and depression 09/17/2019    Insomnia due to other mental disorder 09/17/2019    Mild intermittent asthma without complication 07/09/2019    RLS (restless legs syndrome) 07/09/2019    Herpes simplex 07/09/2019    Essential hypertension 01/03/2019        Allergies: Benadryl [diphenhydramine] and Hydroxyzine    Current Outpatient Medications   Medication Sig Dispense Refill    QUEtiapine (SEROQUEL) 25 MG Tab Take 1 Tablet by mouth at bedtime. 30 Tablet 3    propranolol CR (INDERAL LA) 80 MG CAPSULE SR 24 HR TAKE 1 CAPSULE BY MOUTH EVERY DAY 90 Capsule 0    acyclovir (ZOVIRAX) 400 MG tablet Take 400 mg by mouth 2 times a day.      simvastatin (ZOCOR) 5 MG Tab TAKE 1 TABLET BY MOUTH EVERY DAY IN THE EVENING 90 Tablet 0    vitamin D2,  "Ergocalciferol, (DRISDOL) 1.25 MG (94902 UT) Cap capsule TAKE 1 CAPSULE BY MOUTH ONE TIME PER WEEK 12 Capsule 0    albuterol 108 (90 Base) MCG/ACT Aero Soln inhalation aerosol Inhale 2 Puffs by mouth every 6 hours as needed. 8.5 g 1     No current facility-administered medications for this visit.       Social History     Tobacco Use    Smoking status: Some Days     Packs/day: 0.50     Years: 12.00     Pack years: 6.00     Types: Cigarettes    Smokeless tobacco: Never   Vaping Use    Vaping Use: Never used   Substance Use Topics    Alcohol use: No    Drug use: Yes     Types: Marijuana     Comment: occasional     Social History     Social History Narrative    Not on file       Family History   Problem Relation Age of Onset    Cancer Mother         breast    Diabetes Mother     Other Sister         Crohn's    Alcohol/Drug Brother     Hypertension Sister     Alcohol/Drug Brother     Heart Disease Brother        Allergies, past medical history, past surgical history, family history, social history reviewed and updated.    Review of Systems:     - Constitutional: Negative for fever, chills, unexpected weight change, and fatigue/generalized weakness.      - Respiratory: Negative for cough, sputum production, chest congestion, dyspnea, wheezing, and crackles.      - Cardiovascular: Negative for chest pain, palpitations, orthopnea, and bilateral lower extremity edema.       - Psychiatric/Behavioral: Negative for depression, suicidal/homicidal ideation and memory loss.      All other systems reviewed and are negative    Exam:    /70   Pulse 76   Temp 36.6 °C (97.8 °F) (Temporal)   Resp 16   Ht 1.626 m (5' 4\")   Wt 69.7 kg (153 lb 9.6 oz)   SpO2 97%   BMI 26.37 kg/m²  Body mass index is 26.37 kg/m².    Physical Exam:  Constitutional: Well-developed and well-nourished. Not diaphoretic. No distress.   Cardiovascular: Regular rate and rhythm, S1 and S2 without murmur, rubs, or gallops.    Chest: Effort normal. Clear " to auscultation throughout. No adventitious sounds. Neurological: Alert and oriented x 3.   Psychiatric:  Behavior, mood, and affect are appropriate.  MA/nursing note and vitals reviewed.    Assessment/Plan:  1. Anxiety and depression  Significantly improved.  Patient would like to continue the same dose and medication.  We will follow-up in 3 months.  - QUEtiapine (SEROQUEL) 25 MG Tab; Take 1 Tablet by mouth at bedtime.  Dispense: 30 Tablet; Refill: 3    2. Insomnia due to other mental disorder  As discussed in 1  - QUEtiapine (SEROQUEL) 25 MG Tab; Take 1 Tablet by mouth at bedtime.  Dispense: 30 Tablet; Refill: 3       Discussed with patient possible alternative diagnoses, patient is to take all medications as prescribed.      If symptoms persist FU w/PCP, if symptoms worsen go to emergency room.      If experiencing any side effects from prescribed medications report to the office immediately or go to emergency room.     Reviewed indication, dosage, usage and potential adverse effects of prescribed medications.      Reviewed risks and benefits of treatment plan. Patient verbalizes understanding of all instruction and verbally agrees to plan.     Discussed plan with the patient, and patient agrees to the above.      I personally reviewed prior external notes and test results pertinent to today's visit.      No follow-ups on file. 3 mos

## 2022-10-27 DIAGNOSIS — F34.1 DYSTHYMIA: ICD-10-CM

## 2022-10-27 RX ORDER — FLUOXETINE HYDROCHLORIDE 40 MG/1
40 CAPSULE ORAL DAILY
Qty: 30 CAPSULE | Refills: 1 | Status: SHIPPED | OUTPATIENT
Start: 2022-10-27 | End: 2022-11-22

## 2022-11-19 DIAGNOSIS — F34.1 DYSTHYMIA: ICD-10-CM

## 2022-11-22 RX ORDER — FLUOXETINE HYDROCHLORIDE 40 MG/1
40 CAPSULE ORAL DAILY
Qty: 30 CAPSULE | Refills: 1 | Status: SHIPPED | OUTPATIENT
Start: 2022-11-22 | End: 2022-12-22

## 2022-11-28 DIAGNOSIS — B00.9 HERPES SIMPLEX: ICD-10-CM

## 2022-11-29 RX ORDER — ACYCLOVIR 400 MG/1
400 TABLET ORAL 2 TIMES DAILY
Qty: 180 TABLET | Refills: 1 | OUTPATIENT
Start: 2022-11-29

## 2022-11-29 NOTE — TELEPHONE ENCOUNTER
Received request via: Patient    Was the patient seen in the last year in this department? Yes    Does the patient have an active prescription (recently filled or refills available) for medication(s) requested? No    Does the patient have [facility] and need 100 day supply (blood pressure, diabetes and cholesterol meds only)? Patient does not have SCP

## 2022-12-02 ENCOUNTER — OFFICE VISIT (OUTPATIENT)
Dept: MEDICAL GROUP | Facility: PHYSICIAN GROUP | Age: 72
End: 2022-12-02
Payer: MEDICARE

## 2022-12-02 VITALS
DIASTOLIC BLOOD PRESSURE: 76 MMHG | OXYGEN SATURATION: 97 % | WEIGHT: 153.2 LBS | TEMPERATURE: 98.4 F | HEART RATE: 72 BPM | BODY MASS INDEX: 26.15 KG/M2 | HEIGHT: 64 IN | SYSTOLIC BLOOD PRESSURE: 112 MMHG | RESPIRATION RATE: 16 BRPM

## 2022-12-02 DIAGNOSIS — F34.1 DYSTHYMIA: ICD-10-CM

## 2022-12-02 DIAGNOSIS — B00.9 HERPES SIMPLEX: ICD-10-CM

## 2022-12-02 PROCEDURE — 99214 OFFICE O/P EST MOD 30 MIN: CPT | Performed by: NURSE PRACTITIONER

## 2022-12-02 RX ORDER — ACYCLOVIR 400 MG/1
400 TABLET ORAL 2 TIMES DAILY
Qty: 90 TABLET | Refills: 1 | Status: SHIPPED | OUTPATIENT
Start: 2022-12-02 | End: 2023-01-13

## 2022-12-02 NOTE — PROGRESS NOTES
Chief Complaint   Patient presents with    Follow-Up     On medication       HISTORY OF PRESENT ILLNESS: Patient is a 71 y.o. male, established patient who presents today to discuss medical problems as listed below:    Health Maintenance:  COMPLETED     Herpes simplex  Chronic intermittent problem.  This is well controlled with Zovirax 400 mg twice a day.  2-6 flareups per year.  Triggers are emotional stress and sun exposure.  Needing refills today.    Patient Active Problem List    Diagnosis Date Noted    Dysthymia 08/30/2022    Chronic left shoulder pain 08/30/2022    Mass of left testicle 06/29/2022    Osteophyte of right shoulder 01/04/2021    Chronic pain disorder 01/04/2021    Need for vaccination 10/13/2020    Dental infection 10/13/2020    Vitamin D deficiency 09/24/2020    Mixed hyperlipidemia 09/24/2020    Toothache 05/26/2020    Chronic right shoulder pain 11/05/2019    Chronic pain of right upper extremity 11/05/2019    Anxiety and depression 09/17/2019    Insomnia due to other mental disorder 09/17/2019    Mild intermittent asthma without complication 07/09/2019    RLS (restless legs syndrome) 07/09/2019    Herpes simplex 07/09/2019    Essential hypertension 01/03/2019        Allergies: Benadryl [diphenhydramine] and Hydroxyzine    Current Outpatient Medications   Medication Sig Dispense Refill    acyclovir (ZOVIRAX) 400 MG tablet Take 1 Tablet by mouth 2 times a day. 90 Tablet 1    fluoxetine (PROZAC) 40 MG capsule TAKE 1 CAPSULE BY MOUTH EVERY DAY 30 Capsule 1    QUEtiapine (SEROQUEL) 25 MG Tab Take 1 Tablet by mouth at bedtime. 30 Tablet 3    propranolol CR (INDERAL LA) 80 MG CAPSULE SR 24 HR TAKE 1 CAPSULE BY MOUTH EVERY DAY 90 Capsule 0    simvastatin (ZOCOR) 5 MG Tab TAKE 1 TABLET BY MOUTH EVERY DAY IN THE EVENING 90 Tablet 0    vitamin D2, Ergocalciferol, (DRISDOL) 1.25 MG (37684 UT) Cap capsule TAKE 1 CAPSULE BY MOUTH ONE TIME PER WEEK 12 Capsule 0    albuterol 108 (90 Base) MCG/ACT Aero Soln  "inhalation aerosol Inhale 2 Puffs by mouth every 6 hours as needed. 8.5 g 1     No current facility-administered medications for this visit.       Social History     Tobacco Use    Smoking status: Some Days     Packs/day: 0.50     Years: 12.00     Pack years: 6.00     Types: Cigarettes    Smokeless tobacco: Never   Vaping Use    Vaping Use: Never used   Substance Use Topics    Alcohol use: No    Drug use: Yes     Types: Marijuana     Comment: occasional     Social History     Social History Narrative    Not on file       Family History   Problem Relation Age of Onset    Cancer Mother         breast    Diabetes Mother     Other Sister         Crohn's    Alcohol/Drug Brother     Hypertension Sister     Alcohol/Drug Brother     Heart Disease Brother        Allergies, past medical history, past surgical history, family history, social history reviewed and updated.    Review of Systems:     - Constitutional: Negative for fever, chills, unexpected weight change, and fatigue/generalized weakness.       - Respiratory: Negative for cough, sputum production, chest congestion, dyspnea, wheezing, and crackles.      - Cardiovascular: Negative for chest pain, palpitations, orthopnea, and bilateral lower extremity edema.      - Psychiatric/Behavioral: Negative for depression, suicidal/homicidal ideation and memory loss.      All other systems reviewed and are negative    Exam:    /76   Pulse 72   Temp 36.9 °C (98.4 °F) (Temporal)   Resp 16   Ht 1.626 m (5' 4\")   Wt 69.5 kg (153 lb 3.2 oz)   SpO2 97%   BMI 26.30 kg/m²  Body mass index is 26.3 kg/m².    Physical Exam:  Constitutional: Well-developed and well-nourished. Not diaphoretic. No distress.   Cardiovascular: Regular rate and rhythm, S1 and S2 without murmur, rubs, or gallops.    Chest: Effort normal. Clear to auscultation throughout. No adventitious sounds. Neurological: Alert and oriented x 3.   Psychiatric:  Behavior, mood, and affect are " appropriate.  MA/nursing note and vitals reviewed.    Assessment/Plan:  1. Herpes simplex  Stable on current regimen.  Continue.  Refills given   - acyclovir (ZOVIRAX) 400 MG tablet; Take 1 Tablet by mouth 2 times a day.  Dispense: 90 Tablet; Refill: 1       Discussed with patient possible alternative diagnoses, patient is to take all medications as prescribed.      If symptoms persist FU w/PCP, if symptoms worsen go to emergency room.      If experiencing any side effects from prescribed medications report to the office immediately or go to emergency room.     Reviewed indication, dosage, usage and potential adverse effects of prescribed medications.      Reviewed risks and benefits of treatment plan. Patient verbalizes understanding of all instruction and verbally agrees to plan.     Discussed plan with the patient, and patient agrees to the above.      I personally reviewed prior external notes and test results pertinent to today's visit.      No follow-ups on file.

## 2022-12-02 NOTE — ASSESSMENT & PLAN NOTE
Chronic intermittent problem.  This is well controlled with Zovirax 400 mg twice a day.  2-6 flareups per year.  Triggers are emotional stress and sun exposure.  Needing refills today.

## 2023-01-10 DIAGNOSIS — F41.9 ANXIETY AND DEPRESSION: ICD-10-CM

## 2023-01-10 DIAGNOSIS — F51.05 INSOMNIA DUE TO OTHER MENTAL DISORDER: ICD-10-CM

## 2023-01-10 DIAGNOSIS — F32.A ANXIETY AND DEPRESSION: ICD-10-CM

## 2023-01-10 DIAGNOSIS — E78.2 MIXED HYPERLIPIDEMIA: ICD-10-CM

## 2023-01-10 DIAGNOSIS — F99 INSOMNIA DUE TO OTHER MENTAL DISORDER: ICD-10-CM

## 2023-01-10 RX ORDER — QUETIAPINE FUMARATE 25 MG/1
25 TABLET, FILM COATED ORAL
Qty: 30 TABLET | Refills: 1 | Status: SHIPPED | OUTPATIENT
Start: 2023-01-10

## 2023-01-10 RX ORDER — SIMVASTATIN 5 MG
5 TABLET ORAL NIGHTLY
Qty: 90 TABLET | Refills: 0 | Status: SHIPPED | OUTPATIENT
Start: 2023-01-10 | End: 2023-07-05

## 2023-01-10 NOTE — TELEPHONE ENCOUNTER
Received request via: Patient    Was the patient seen in the last year in this department? Yes    Does the patient have an active prescription (recently filled or refills available) for medication(s) requested? No    Does the patient have [facility] and need 100 day supply (blood pressure, diabetes and cholesterol meds only)? Patient does not have SCP

## 2023-03-14 ENCOUNTER — OFFICE VISIT (OUTPATIENT)
Dept: MEDICAL GROUP | Facility: PHYSICIAN GROUP | Age: 73
End: 2023-03-14
Payer: MEDICARE

## 2023-03-14 VITALS
TEMPERATURE: 97.4 F | WEIGHT: 151.4 LBS | HEIGHT: 64 IN | OXYGEN SATURATION: 95 % | DIASTOLIC BLOOD PRESSURE: 74 MMHG | HEART RATE: 67 BPM | BODY MASS INDEX: 25.85 KG/M2 | RESPIRATION RATE: 16 BRPM | SYSTOLIC BLOOD PRESSURE: 128 MMHG

## 2023-03-14 DIAGNOSIS — R73.09 ELEVATED GLUCOSE: ICD-10-CM

## 2023-03-14 DIAGNOSIS — Z12.11 SCREENING FOR COLON CANCER: ICD-10-CM

## 2023-03-14 DIAGNOSIS — J45.20 MILD INTERMITTENT ASTHMA WITHOUT COMPLICATION: ICD-10-CM

## 2023-03-14 DIAGNOSIS — Z12.5 PROSTATE CANCER SCREENING: ICD-10-CM

## 2023-03-14 DIAGNOSIS — I10 ESSENTIAL HYPERTENSION: ICD-10-CM

## 2023-03-14 DIAGNOSIS — F33.1 MODERATE EPISODE OF RECURRENT MAJOR DEPRESSIVE DISORDER (HCC): ICD-10-CM

## 2023-03-14 PROCEDURE — 99214 OFFICE O/P EST MOD 30 MIN: CPT | Performed by: PHYSICIAN ASSISTANT

## 2023-03-14 RX ORDER — FLUOXETINE HYDROCHLORIDE 40 MG/1
40 CAPSULE ORAL DAILY
Qty: 90 CAPSULE | Refills: 3 | Status: SHIPPED | OUTPATIENT
Start: 2023-03-14 | End: 2024-01-02

## 2023-03-14 ASSESSMENT — PATIENT HEALTH QUESTIONNAIRE - PHQ9
SUM OF ALL RESPONSES TO PHQ QUESTIONS 1-9: 15
CLINICAL INTERPRETATION OF PHQ2 SCORE: 6
5. POOR APPETITE OR OVEREATING: 0 - NOT AT ALL

## 2023-03-14 NOTE — PROGRESS NOTES
CC:    Chief Complaint   Patient presents with    Establish Care    Medication Refill     fluoxetine        HISTORY OF THE PRESENT ILLNESS: Patient is a 72 y.o. male presenting to establish primary care     Pt has been having diarrhea since having his gallbladder removed two years ago.   Pt gets herpes labialis. Takes acyclovir.   Pt on albuterol for chemical induced asthma  Pt on prozac for anxiety and depression. Also taking seroquel for insomnia which is working well.   Pt on propranolol for HTN  Pt used to be on Sinemet for RLS. Stopped it because it was not working.     Depression Screening    Little interest or pleasure in doing things?  3 - nearly every day   Feeling down, depressed , or hopeless? 3 - nearly every day   Trouble falling or staying asleep, or sleeping too much?  3 - nearly every day   Feeling tired or having little energy?  3 - nearly every day   Poor appetite or overeating?  0 - not at all   Feeling bad about yourself - or that you are a failure or have let yourself or your family down? 3 - nearly every day   Trouble concentrating on things, such as reading the newspaper or watching television? 0 - not at all   Moving or speaking so slowly that other people could have noticed.  Or the opposite - being so fidgety or restless that you have been moving around a lot more than usual?  0 - not at all   Thoughts that you would be better off dead, or of hurting yourself?  0 - not at all   Patient Health Questionnaire Score: 15       If depressive symptoms identified deferred to follow up visit unless specifically addressed in assesment and plan.    Interpretation of PHQ-9 Total Score   Score Severity   1-4 No Depression   5-9 Mild Depression   10-14 Moderate Depression   15-19 Moderately Severe Depression   20-27 Severe Depression    No problem-specific Assessment & Plan notes found for this encounter.    Allergies: Benadryl [diphenhydramine] and Hydroxyzine    Current Outpatient Medications Ordered  in Epic   Medication Sig Dispense Refill    fluoxetine (PROZAC) 40 MG capsule Take 1 Capsule by mouth every day. 90 Capsule 3    acyclovir (ZOVIRAX) 400 MG tablet TAKE 1 TABLET BY MOUTH TWICE A DAY 90 Tablet 0    propranolol CR (INDERAL LA) 80 MG CAPSULE SR 24 HR TAKE 1 CAPSULE BY MOUTH EVERY DAY 90 Capsule 0    QUEtiapine (SEROQUEL) 25 MG Tab TAKE 1 TABLET BY MOUTH EVERYDAY AT BEDTIME 90 Tablet 0    simvastatin (ZOCOR) 5 MG Tab Take 1 Tablet by mouth every evening. 90 Tablet 0    vitamin D2, Ergocalciferol, (DRISDOL) 1.25 MG (59788 UT) Cap capsule TAKE 1 CAPSULE BY MOUTH ONE TIME PER WEEK 12 Capsule 0    albuterol 108 (90 Base) MCG/ACT Aero Soln inhalation aerosol Inhale 2 Puffs by mouth every 6 hours as needed. 8.5 g 1    QUEtiapine (SEROQUEL) 25 MG Tab Take 1 Tablet by mouth at bedtime. (Patient not taking: Reported on 3/14/2023) 30 Tablet 1     No current Epic-ordered facility-administered medications on file.       Past Medical History:   Diagnosis Date    Anxiety     Asthma     Depression     Herpes simplex     Hypertension     Restless leg syndrome     Shoulder pain, right        Past Surgical History:   Procedure Laterality Date    CHOLECYSTECTOMY  2019    ARTHROPLASTY, SHOULDER Right     HERNIA REPAIR Left     RECONSTRUCTION, KNEE, ACL Left     TONSILLECTOMY AND ADENOIDECTOMY      VASECTOMY         Social History     Tobacco Use    Smoking status: Some Days     Packs/day: 0.50     Years: 12.00     Pack years: 6.00     Types: Cigarettes    Smokeless tobacco: Never   Vaping Use    Vaping Use: Never used   Substance Use Topics    Alcohol use: No    Drug use: Yes     Types: Marijuana     Comment: occasional       Social History     Social History Narrative    Not on file       Family History   Problem Relation Age of Onset    Cancer Mother         breast    Diabetes Mother     Other Sister         Crohn's    Alcohol/Drug Brother     Hypertension Sister     Alcohol/Drug Brother     Heart Disease Brother   "      ROS:     - Constitutional: Negative for fever, chills, unexpected weight change, and fatigue/generalized weakness.     - HEENT: Negative for headaches, vision changes, hearing changes, ear pain, ear discharge, rhinorrhea, sinus congestion, sore throat, and neck pain.      - Respiratory: Negative for cough, sputum production, chest congestion, dyspnea, wheezing, and crackles.      - Cardiovascular: Negative for chest pain, palpitations, orthopnea, and bilateral lower extremity edema.     - Gastrointestinal:Positive for diarrhea.  Negative for heartburn, nausea, vomiting, abdominal pain, hematochezia, melena, diarrhea, constipation, and greasy/foul-smelling stools.     - Genitourinary: Negative for dysuria, polyuria, hematuria, pyuria, urinary urgency, and urinary incontinence.     - Musculoskeletal: Negative for myalgias, back pain, and joint pain.     - Skin: Negative for rash, itching, cyanotic skin color change.     - Neurological: Negative for dizziness, tingling, tremors, focal sensory deficit, focal weakness and headaches.     - Endo/Heme/Allergies: Does not bruise/bleed easily.     - Psychiatric/Behavioral: Positive for anxiety and depression. Negative for suicidal/homicidal ideation and memory loss.            .      Exam: /74   Pulse 67   Temp 36.3 °C (97.4 °F) (Temporal)   Resp 16   Ht 1.626 m (5' 4\")   Wt 68.7 kg (151 lb 6.4 oz)   SpO2 95%  Body mass index is 25.99 kg/m².    General: Normal appearing. No acute distress.  Skin: Warm and dry.  No obvious lesions.  HEENT: Normocephalic. Eyes conjunctiva clear lids without ptosis, ears normal shape and contour  Cardiovascular: Regular rate and rhythm without murmur.   Respiratory: Clear to auscultation bilaterally, no rhonchi wheezing or rales.  Neurologic: Grossly nonfocal, A&O x3, gait normal,  Musculoskeletal: No deformity or swelling.   Extremities: No extremity cyanosis, clubbing, or edema.  Psych: Normal mood and affect. Judgment and " insight is normal.    Please note that this dictation was created using voice recognition software. I have made every reasonable attempt to correct obvious errors, but I expect that there are errors of grammar and possibly content that I did not discover before finalizing the note.      Assessment/Plan  1. Essential hypertension  Well controlled.  - CBC WITH DIFFERENTIAL; Future  - Comp Metabolic Panel; Future  - Lipid Profile; Future    2. Moderate episode of recurrent major depressive disorder (HCC)  Stable. Refill sent in. Continue with talk therapy  - fluoxetine (PROZAC) 40 MG capsule; Take 1 Capsule by mouth every day.  Dispense: 90 Capsule; Refill: 3    3. Screening for colon cancer  Advised to call GI office and schedule    4. Mild intermittent asthma without complication  Stable.     5. Prostate cancer screening  Continue to monitor  - PROSTATE SPECIFIC AG SCREENING; Future    6. Elevated glucose    - HEMOGLOBIN A1C; Future

## 2023-03-16 DIAGNOSIS — B00.9 HERPES SIMPLEX: ICD-10-CM

## 2023-03-16 NOTE — PROGRESS NOTES
Complaint: 6 month f/u.     Subjective:     Raffaele Burton is a 68 y.o. male here today for f/u. Reviewed recent labs with patient.    Cholelithiasis  To undergo lap mor next week by  at [facility].    Elevated blood sugar  Recent FBS was elevated at 115. Needs A1c.    Essential hypertension  Controlled on Inderal LA.    Mild intermittent asthma without complication  Uses his MDI off & on only.    RLS (restless legs syndrome)  Controlled on Sinemet  qd.    Herpes simplex  Suppressed with Zovirax.     No other concerns or complaints.    Current medicines (including changes today)  Current Outpatient Prescriptions   Medication Sig Dispense Refill   • carbidopa-levodopa (SINEMET)  MG Tab Take 1 Tab by mouth every day. 90 Tab 1   • acyclovir (ZOVIRAX) 400 MG tablet Take 1 Tab by mouth 2 times daily with meals as needed. 180 Tab 1   • propranolol CR (INDERAL LA) 80 MG CAPSULE SR 24 HR      • methadone (DOLOPHINE) 5 MG Tab Take 5 mg by mouth every 12 hours. FOR 30 DAYS  0   • DULoxetine (CYMBALTA) 60 MG Cap DR Particles delayed-release capsule Take 60 mg by mouth every day.     • albuterol 108 (90 Base) MCG/ACT Aero Soln inhalation aerosol Inhale 2 Puffs by mouth every 6 hours as needed.       No current facility-administered medications for this visit.      He  has a past medical history of Anxiety; Asthma; Herpes simplex; Hypertension; Restless leg syndrome; and Shoulder pain, right.    Health Maintenance:       Allergies: Benadryl [diphenhydramine] and Hydroxyzine    Current Outpatient Prescriptions Ordered in [employer]   Medication Sig Dispense Refill   • carbidopa-levodopa (SINEMET)  MG Tab Take 1 Tab by mouth every day. 90 Tab 1   • acyclovir (ZOVIRAX) 400 MG tablet Take 1 Tab by mouth 2 times daily with meals as needed. 180 Tab 1   • propranolol CR (INDERAL LA) 80 MG CAPSULE SR 24 HR      • methadone (DOLOPHINE) 5 MG Tab Take 5 mg by mouth every 12 hours. FOR 30 DAYS  0   • DULoxetine  Patient PCP is Dr. Franco at the [facility]. Can we see if she can order a [facility] so it can get done closer to home and then we will follow up results.  Sorry this may be challenging to arrange.   Thanks! "(CYMBALTA) 60 MG Cap DR Particles delayed-release capsule Take 60 mg by mouth every day.     • albuterol 108 (90 Base) MCG/ACT Aero Soln inhalation aerosol Inhale 2 Puffs by mouth every 6 hours as needed.       No current Epic-ordered facility-administered medications on file.        Past Medical History:   Diagnosis Date   • Anxiety    • Asthma    • Herpes simplex    • Hypertension    • Restless leg syndrome    • Shoulder pain, right        Past Surgical History:   Procedure Laterality Date   • ACL RECONSTRUCTION Left    • ARTHROPLASTY, SHOULDER Right    • HERNIA REPAIR Left    • TONSILLECTOMY AND ADENOIDECTOMY     • VASECTOMY         Social History   Substance Use Topics   • Smoking status: Former Smoker     Types: Cigarettes     Quit date: 1/3/2014   • Smokeless tobacco: Never Used   • Alcohol use No       Social History     Social History Narrative   • No narrative on file       Family History   Problem Relation Age of Onset   • Cancer Mother         breast   • Diabetes Mother    • Other Sister         Crohn's   • Alcohol/Drug Brother    • Hypertension Sister    • Alcohol/Drug Brother    • Heart Disease Brother          ROS   Patient denies any fever, chills, unintentional weight gain/loss, fatigue, stroke symptoms, dizziness, headache, nasal congestion, sore-throat, cough, heartburn, chest pain, difficulty breathing, abdominal discomfort, diarrhea/constipation, burning with urination or frequency, joint or back pain, skin rashes, depression or anxiety.       Objective:     /82   Pulse 78   Temp 36.7 °C (98 °F) (Temporal)   Resp 14   Ht 1.651 m (5' 5\")   Wt 68.3 kg (150 lb 9.2 oz)   SpO2 94%  Body mass index is 25.06 kg/m².   Physical Exam:  Constitutional: Alert, no distress.  No formal exam        Assessment and Plan:   The following treatment plan was discussed    1. Elevated blood sugar  Will notify if abnl. Needs to lose weight anyway.  - HEMOGLOBIN A1C; Future    2. Mild intermittent asthma " without complication  Controlled.    3. RLS (restless legs syndrome)  Controlled.    4. Essential hypertension  Controlled.    5. Herpes simplex  Controlled.      Followup: Return in about 6 months (around 1/9/2020), or if symptoms worsen or fail to improve.    Please note that this dictation was created using voice recognition software. I have made every reasonable attempt to correct obvious errors, but I expect that there are errors of grammar and possibly content that I did not discover before finalizing the note.

## 2023-03-17 NOTE — TELEPHONE ENCOUNTER
Received request via: Pharmacy    Was the patient seen in the last year in this department? Yes    Does the patient have an active prescription (recently filled or refills available) for medication(s) requested?  Pharmacy comment: REQUEST FOR 90 DAYS PRESCRIPTION    Does the patient have custodial Plus and need 100 day supply (blood pressure, diabetes and cholesterol meds only)? Patient does not have SCP

## 2023-03-20 RX ORDER — ACYCLOVIR 400 MG/1
TABLET ORAL
Qty: 180 TABLET | Refills: 1 | Status: SHIPPED | OUTPATIENT
Start: 2023-03-20

## 2023-04-07 DIAGNOSIS — I10 ESSENTIAL HYPERTENSION: ICD-10-CM

## 2023-04-07 RX ORDER — PROPRANOLOL HYDROCHLORIDE 80 MG/1
CAPSULE, EXTENDED RELEASE ORAL
Qty: 90 CAPSULE | Refills: 0 | Status: SHIPPED | OUTPATIENT
Start: 2023-04-07 | End: 2023-06-12

## 2023-04-08 DIAGNOSIS — F51.05 INSOMNIA DUE TO OTHER MENTAL DISORDER: ICD-10-CM

## 2023-04-08 DIAGNOSIS — F41.9 ANXIETY AND DEPRESSION: ICD-10-CM

## 2023-04-08 DIAGNOSIS — F99 INSOMNIA DUE TO OTHER MENTAL DISORDER: ICD-10-CM

## 2023-04-08 DIAGNOSIS — F32.A ANXIETY AND DEPRESSION: ICD-10-CM

## 2023-04-10 RX ORDER — QUETIAPINE FUMARATE 25 MG/1
TABLET, FILM COATED ORAL
Qty: 90 TABLET | Refills: 0 | Status: SHIPPED | OUTPATIENT
Start: 2023-04-10 | End: 2023-06-12

## 2023-06-09 DIAGNOSIS — F41.9 ANXIETY AND DEPRESSION: ICD-10-CM

## 2023-06-09 DIAGNOSIS — I10 ESSENTIAL HYPERTENSION: ICD-10-CM

## 2023-06-09 DIAGNOSIS — F51.05 INSOMNIA DUE TO OTHER MENTAL DISORDER: ICD-10-CM

## 2023-06-09 DIAGNOSIS — F32.A ANXIETY AND DEPRESSION: ICD-10-CM

## 2023-06-09 DIAGNOSIS — F99 INSOMNIA DUE TO OTHER MENTAL DISORDER: ICD-10-CM

## 2023-06-12 RX ORDER — QUETIAPINE FUMARATE 25 MG/1
TABLET, FILM COATED ORAL
Qty: 30 TABLET | Refills: 2 | Status: SHIPPED | OUTPATIENT
Start: 2023-06-12 | End: 2023-09-13

## 2023-06-12 RX ORDER — PROPRANOLOL HYDROCHLORIDE 80 MG/1
CAPSULE, EXTENDED RELEASE ORAL
Qty: 30 CAPSULE | Refills: 2 | Status: SHIPPED | OUTPATIENT
Start: 2023-06-12 | End: 2023-09-07

## 2023-06-14 ENCOUNTER — TELEPHONE (OUTPATIENT)
Dept: HEALTH INFORMATION MANAGEMENT | Facility: OTHER | Age: 73
End: 2023-06-14

## 2023-07-05 DIAGNOSIS — E78.2 MIXED HYPERLIPIDEMIA: ICD-10-CM

## 2023-07-05 RX ORDER — SIMVASTATIN 5 MG
TABLET ORAL
Qty: 90 TABLET | Refills: 0 | Status: SHIPPED | OUTPATIENT
Start: 2023-07-05 | End: 2023-10-09

## 2023-09-07 DIAGNOSIS — I10 ESSENTIAL HYPERTENSION: ICD-10-CM

## 2023-09-07 RX ORDER — PROPRANOLOL HYDROCHLORIDE 80 MG/1
CAPSULE, EXTENDED RELEASE ORAL
Qty: 90 CAPSULE | Refills: 1 | Status: SHIPPED | OUTPATIENT
Start: 2023-09-07 | End: 2024-02-22

## 2023-09-07 NOTE — TELEPHONE ENCOUNTER
Received request via: Patient    Was the patient seen in the last year in this department? Yes    Does the patient have an active prescription (recently filled or refills available) for medication(s) requested? No    Does the patient have [facility] Plus and need 100 day supply (blood pressure, diabetes and cholestel meds only)? Patient does not have SCP and

## 2023-09-13 DIAGNOSIS — F51.05 INSOMNIA DUE TO OTHER MENTAL DISORDER: ICD-10-CM

## 2023-09-13 DIAGNOSIS — F41.9 ANXIETY AND DEPRESSION: ICD-10-CM

## 2023-09-13 DIAGNOSIS — F99 INSOMNIA DUE TO OTHER MENTAL DISORDER: ICD-10-CM

## 2023-09-13 DIAGNOSIS — F32.A ANXIETY AND DEPRESSION: ICD-10-CM

## 2023-09-13 RX ORDER — QUETIAPINE FUMARATE 25 MG/1
TABLET, FILM COATED ORAL
Qty: 90 TABLET | Refills: 1 | Status: SHIPPED | OUTPATIENT
Start: 2023-09-13

## 2023-10-07 DIAGNOSIS — E78.2 MIXED HYPERLIPIDEMIA: ICD-10-CM

## 2023-10-09 RX ORDER — SIMVASTATIN 5 MG
TABLET ORAL
Qty: 90 TABLET | Refills: 0 | Status: SHIPPED | OUTPATIENT
Start: 2023-10-09 | End: 2024-02-20

## 2023-12-31 DIAGNOSIS — F33.1 MODERATE EPISODE OF RECURRENT MAJOR DEPRESSIVE DISORDER (HCC): ICD-10-CM

## 2024-01-02 RX ORDER — FLUOXETINE HYDROCHLORIDE 40 MG/1
40 CAPSULE ORAL DAILY
Qty: 90 CAPSULE | Refills: 3 | Status: SHIPPED | OUTPATIENT
Start: 2024-01-02

## 2024-02-19 DIAGNOSIS — E78.2 MIXED HYPERLIPIDEMIA: ICD-10-CM

## 2024-02-20 RX ORDER — SIMVASTATIN 5 MG
TABLET ORAL
Qty: 30 TABLET | Refills: 0 | Status: SHIPPED | OUTPATIENT
Start: 2024-02-20 | End: 2024-03-25

## 2024-02-20 NOTE — TELEPHONE ENCOUNTER
Called patient to schedule a screening mammogram PATIENTPHONEMESSAGE: left message.-     Additional information   Received request via: Pharmacy    Was the patient seen in the last year in this department? Yes    Does the patient have an active prescription (recently filled or refills available) for medication(s) requested? No      Does the patient have custodial Plus and need 100 day supply (blood pressure, diabetes and cholesterol meds only)? Patient does not have SCP

## 2024-02-22 DIAGNOSIS — I10 ESSENTIAL HYPERTENSION: ICD-10-CM

## 2024-02-22 RX ORDER — PROPRANOLOL HYDROCHLORIDE 80 MG/1
CAPSULE, EXTENDED RELEASE ORAL
Qty: 90 CAPSULE | Refills: 1 | Status: SHIPPED | OUTPATIENT
Start: 2024-02-22

## 2024-02-22 NOTE — TELEPHONE ENCOUNTER
Received request via: Patient    Was the patient seen in the last year in this department? Yes    Does the patient have an active prescription (recently filled or refills available) for medication(s) requested? No    [facility]    Does the patient have [facility] and need 100 day supply (blood pressure, diabetes and cholesterol meds only)? Patient does not have SCP

## 2024-03-23 DIAGNOSIS — E78.2 MIXED HYPERLIPIDEMIA: ICD-10-CM

## 2024-03-25 RX ORDER — SIMVASTATIN 5 MG
TABLET ORAL
Qty: 90 TABLET | Refills: 0 | Status: SHIPPED | OUTPATIENT
Start: 2024-03-25

## 2024-03-25 NOTE — TELEPHONE ENCOUNTER
Received request via: Pharmacy    Was the patient seen in the last year in this department? Yes    Does the patient have an active prescription (recently filled or refills available) for medication(s) requested? No    Pharmacy Name: Pharmacy:   [facility]/Pharmacy #[id number] - [facility] [address] [facility] [address]     Does the patient have assisted Plus and need 100 day supply (blood pressure, diabetes and cholesterol meds only)? Patient does not have SCP

## 2024-04-01 DIAGNOSIS — F41.9 ANXIETY AND DEPRESSION: ICD-10-CM

## 2024-04-01 DIAGNOSIS — F51.05 INSOMNIA DUE TO OTHER MENTAL DISORDER: ICD-10-CM

## 2024-04-01 DIAGNOSIS — F32.A ANXIETY AND DEPRESSION: ICD-10-CM

## 2024-04-01 DIAGNOSIS — F99 INSOMNIA DUE TO OTHER MENTAL DISORDER: ICD-10-CM

## 2024-04-01 RX ORDER — QUETIAPINE FUMARATE 25 MG/1
TABLET, FILM COATED ORAL
Qty: 90 TABLET | Refills: 0 | Status: SHIPPED | OUTPATIENT
Start: 2024-04-01

## 2024-04-01 NOTE — TELEPHONE ENCOUNTER
Received request via: Pharmacy    Was the patient seen in the last year in this department? No    Does the patient have an active prescription (recently filled or refills available) for medication(s) requested? No    Pharmacy Name: Pharmacy:   [facility]/Pharmacy #[id number] - [facility] [address] [facility] [address]     Does the patient have group home Plus and need 100 day supply (blood pressure, diabetes and cholesterol meds only)? Patient does not have SCP

## 2024-07-01 ENCOUNTER — OFFICE VISIT (OUTPATIENT)
Dept: MEDICAL GROUP | Facility: PHYSICIAN GROUP | Age: 74
End: 2024-07-01
Payer: MEDICARE

## 2024-07-01 VITALS
HEIGHT: 63 IN | TEMPERATURE: 98 F | BODY MASS INDEX: 25.27 KG/M2 | DIASTOLIC BLOOD PRESSURE: 70 MMHG | HEART RATE: 65 BPM | WEIGHT: 142.6 LBS | OXYGEN SATURATION: 96 % | RESPIRATION RATE: 16 BRPM | SYSTOLIC BLOOD PRESSURE: 118 MMHG

## 2024-07-01 DIAGNOSIS — F51.05 INSOMNIA DUE TO OTHER MENTAL DISORDER: ICD-10-CM

## 2024-07-01 DIAGNOSIS — F32.A ANXIETY AND DEPRESSION: ICD-10-CM

## 2024-07-01 DIAGNOSIS — R73.03 PREDIABETES: ICD-10-CM

## 2024-07-01 DIAGNOSIS — Z12.11 SCREENING FOR COLON CANCER: ICD-10-CM

## 2024-07-01 DIAGNOSIS — E78.2 MIXED HYPERLIPIDEMIA: ICD-10-CM

## 2024-07-01 DIAGNOSIS — F33.1 MODERATE EPISODE OF RECURRENT MAJOR DEPRESSIVE DISORDER (HCC): ICD-10-CM

## 2024-07-01 DIAGNOSIS — F99 INSOMNIA DUE TO OTHER MENTAL DISORDER: ICD-10-CM

## 2024-07-01 DIAGNOSIS — F41.9 ANXIETY AND DEPRESSION: ICD-10-CM

## 2024-07-01 DIAGNOSIS — I10 ESSENTIAL HYPERTENSION: ICD-10-CM

## 2024-07-01 PROCEDURE — 3074F SYST BP LT 130 MM HG: CPT | Performed by: PHYSICIAN ASSISTANT

## 2024-07-01 PROCEDURE — 3078F DIAST BP <80 MM HG: CPT | Performed by: PHYSICIAN ASSISTANT

## 2024-07-01 PROCEDURE — 99214 OFFICE O/P EST MOD 30 MIN: CPT | Performed by: PHYSICIAN ASSISTANT

## 2024-07-01 RX ORDER — QUETIAPINE FUMARATE 25 MG/1
25 TABLET, FILM COATED ORAL
Qty: 90 TABLET | Refills: 0 | Status: SHIPPED | OUTPATIENT
Start: 2024-07-01

## 2024-07-01 ASSESSMENT — PATIENT HEALTH QUESTIONNAIRE - PHQ9
SUM OF ALL RESPONSES TO PHQ9 QUESTIONS 1 AND 2: 3
SUM OF ALL RESPONSES TO PHQ QUESTIONS 1-9: 15
6. FEELING BAD ABOUT YOURSELF - OR THAT YOU ARE A FAILURE OR HAVE LET YOURSELF OR YOUR FAMILY DOWN: NEARLY EVERY DAY
1. LITTLE INTEREST OR PLEASURE IN DOING THINGS: NEARLY EVERY DAY
7. TROUBLE CONCENTRATING ON THINGS, SUCH AS READING THE NEWSPAPER OR WATCHING TELEVISION: NOT AT ALL
9. THOUGHTS THAT YOU WOULD BE BETTER OFF DEAD, OR OF HURTING YOURSELF: NOT AT ALL
3. TROUBLE FALLING OR STAYING ASLEEP OR SLEEPING TOO MUCH: NEARLY EVERY DAY
8. MOVING OR SPEAKING SO SLOWLY THAT OTHER PEOPLE COULD HAVE NOTICED. OR THE OPPOSITE, BEING SO FIGETY OR RESTLESS THAT YOU HAVE BEEN MOVING AROUND A LOT MORE THAN USUAL: NOT AT ALL
5. POOR APPETITE OR OVEREATING: NEARLY EVERY DAY
2. FEELING DOWN, DEPRESSED, IRRITABLE, OR HOPELESS: NOT AT ALL
4. FEELING TIRED OR HAVING LITTLE ENERGY: NEARLY EVERY DAY

## 2024-08-22 DIAGNOSIS — I10 ESSENTIAL HYPERTENSION: ICD-10-CM

## 2024-08-22 RX ORDER — PROPRANOLOL HYDROCHLORIDE 80 MG/1
CAPSULE, EXTENDED RELEASE ORAL
Qty: 90 CAPSULE | Refills: 1 | Status: SHIPPED | OUTPATIENT
Start: 2024-08-22

## 2024-08-22 NOTE — TELEPHONE ENCOUNTER
Received request via: Pharmacy    Was the patient seen in the last year in this department? Yes    Does the patient have an active prescription (recently filled or refills available) for medication(s) requested? No    [facility]     Does the patient have [facility] Plus and need 100-day supply? (This applies to ALL medications) Patient does not have SCP

## 2024-09-25 DIAGNOSIS — F51.05 INSOMNIA DUE TO OTHER MENTAL DISORDER: ICD-10-CM

## 2024-09-25 DIAGNOSIS — F32.A ANXIETY AND DEPRESSION: ICD-10-CM

## 2024-09-25 DIAGNOSIS — F41.9 ANXIETY AND DEPRESSION: ICD-10-CM

## 2024-09-25 DIAGNOSIS — F99 INSOMNIA DUE TO OTHER MENTAL DISORDER: ICD-10-CM

## 2024-09-25 RX ORDER — QUETIAPINE FUMARATE 25 MG/1
25 TABLET, FILM COATED ORAL
Qty: 90 TABLET | Refills: 0 | Status: SHIPPED | OUTPATIENT
Start: 2024-09-25

## 2024-09-25 NOTE — TELEPHONE ENCOUNTER
Received request via: Pharmacy    Was the patient seen in the last year in this department? Yes    Does the patient have an active prescription (recently filled or refills available) for medication(s) requested? No    Pharmacy Name:   [facility]/pharmacy #[id number] - [place], [place] - [address] [facility] [address]  [address] [facility] [address] [facility] [address]  Phone: [phone] Fax: [phone]    Does the patient have care home Plus and need 100-day supply? (This applies to ALL medications) Patient does not have SCP

## 2024-10-18 DIAGNOSIS — F33.1 MODERATE EPISODE OF RECURRENT MAJOR DEPRESSIVE DISORDER (HCC): ICD-10-CM

## 2024-10-21 RX ORDER — FLUOXETINE 40 MG/1
40 CAPSULE ORAL DAILY
Qty: 90 CAPSULE | Refills: 3 | Status: SHIPPED | OUTPATIENT
Start: 2024-10-21

## 2024-10-22 DIAGNOSIS — E78.2 MIXED HYPERLIPIDEMIA: ICD-10-CM

## 2024-10-22 RX ORDER — SIMVASTATIN 5 MG
5 TABLET ORAL EVERY EVENING
Qty: 90 TABLET | Refills: 0 | Status: SHIPPED | OUTPATIENT
Start: 2024-10-22

## 2025-01-26 DIAGNOSIS — F51.05 INSOMNIA DUE TO OTHER MENTAL DISORDER: ICD-10-CM

## 2025-01-26 DIAGNOSIS — F32.A ANXIETY AND DEPRESSION: ICD-10-CM

## 2025-01-26 DIAGNOSIS — F41.9 ANXIETY AND DEPRESSION: ICD-10-CM

## 2025-01-26 DIAGNOSIS — F99 INSOMNIA DUE TO OTHER MENTAL DISORDER: ICD-10-CM

## 2025-01-27 DIAGNOSIS — E78.2 MIXED HYPERLIPIDEMIA: ICD-10-CM

## 2025-01-27 RX ORDER — SIMVASTATIN 5 MG
5 TABLET ORAL EVERY EVENING
Qty: 90 TABLET | Refills: 0 | Status: SHIPPED | OUTPATIENT
Start: 2025-01-27

## 2025-01-27 RX ORDER — QUETIAPINE FUMARATE 25 MG/1
25 TABLET, FILM COATED ORAL
Qty: 90 TABLET | Refills: 0 | Status: SHIPPED | OUTPATIENT
Start: 2025-01-27

## 2025-02-19 DIAGNOSIS — I10 ESSENTIAL HYPERTENSION: ICD-10-CM

## 2025-02-19 RX ORDER — PROPRANOLOL HYDROCHLORIDE 80 MG/1
80 CAPSULE, EXTENDED RELEASE ORAL
Qty: 90 CAPSULE | Refills: 1 | Status: SHIPPED | OUTPATIENT
Start: 2025-02-19

## 2025-05-11 DIAGNOSIS — F41.9 ANXIETY AND DEPRESSION: ICD-10-CM

## 2025-05-11 DIAGNOSIS — F99 INSOMNIA DUE TO OTHER MENTAL DISORDER: ICD-10-CM

## 2025-05-11 DIAGNOSIS — F32.A ANXIETY AND DEPRESSION: ICD-10-CM

## 2025-05-11 DIAGNOSIS — F51.05 INSOMNIA DUE TO OTHER MENTAL DISORDER: ICD-10-CM

## 2025-05-12 RX ORDER — QUETIAPINE FUMARATE 25 MG/1
25 TABLET, FILM COATED ORAL
Qty: 90 TABLET | Refills: 0 | Status: SHIPPED | OUTPATIENT
Start: 2025-05-12

## 2025-06-12 DIAGNOSIS — F33.1 MODERATE EPISODE OF RECURRENT MAJOR DEPRESSIVE DISORDER (HCC): ICD-10-CM

## 2025-06-12 NOTE — TELEPHONE ENCOUNTER
Received request via: Pharmacy    Was the patient seen in the last year in this department? Yes    Does the patient have an active prescription (recently filled or refills available) for medication(s) requested? No    Pharmacy Name:  [facility]/pharmacy #[id number] - [place], [place] - [address] [facility] [address]     Does the patient have custodial Plus and need 100-day supply? (This applies to ALL medications) Patient does not have SCP

## 2025-06-13 RX ORDER — FLUOXETINE HYDROCHLORIDE 40 MG/1
40 CAPSULE ORAL DAILY
Qty: 90 CAPSULE | Refills: 3 | Status: SHIPPED | OUTPATIENT
Start: 2025-06-13

## 2025-08-28 DIAGNOSIS — I10 ESSENTIAL HYPERTENSION: ICD-10-CM

## 2025-08-28 RX ORDER — PROPRANOLOL HYDROCHLORIDE 80 MG/1
80 CAPSULE, EXTENDED RELEASE ORAL
Qty: 90 CAPSULE | Refills: 1 | Status: SHIPPED | OUTPATIENT
Start: 2025-08-28